# Patient Record
Sex: FEMALE | Race: WHITE | NOT HISPANIC OR LATINO | Employment: FULL TIME | URBAN - METROPOLITAN AREA
[De-identification: names, ages, dates, MRNs, and addresses within clinical notes are randomized per-mention and may not be internally consistent; named-entity substitution may affect disease eponyms.]

---

## 2017-07-25 ENCOUNTER — ALLSCRIPTS OFFICE VISIT (OUTPATIENT)
Dept: OTHER | Facility: OTHER | Age: 57
End: 2017-07-25

## 2017-07-25 DIAGNOSIS — Z12.31 ENCOUNTER FOR SCREENING MAMMOGRAM FOR MALIGNANT NEOPLASM OF BREAST: ICD-10-CM

## 2017-07-26 ENCOUNTER — GENERIC CONVERSION - ENCOUNTER (OUTPATIENT)
Dept: OTHER | Facility: OTHER | Age: 57
End: 2017-07-26

## 2017-07-31 ENCOUNTER — GENERIC CONVERSION - ENCOUNTER (OUTPATIENT)
Dept: OTHER | Facility: OTHER | Age: 57
End: 2017-07-31

## 2017-07-31 LAB
ADEQUACY: (HISTORICAL): NORMAL
CLINICIAN PROVIDIED ICD 9 OR 10 (HISTORICAL): NORMAL
COMMENT (HISTORICAL): NORMAL
DIAGNOSIS (HISTORICAL): NORMAL
NOTE: (HISTORICAL): NORMAL
PERFORMED BY (HISTORICAL): NORMAL
QC REVIEWED BY (HISTORICAL): NORMAL

## 2017-08-04 ENCOUNTER — GENERIC CONVERSION - ENCOUNTER (OUTPATIENT)
Dept: OTHER | Facility: OTHER | Age: 57
End: 2017-08-04

## 2017-08-17 ENCOUNTER — GENERIC CONVERSION - ENCOUNTER (OUTPATIENT)
Dept: OTHER | Facility: OTHER | Age: 57
End: 2017-08-17

## 2018-01-13 VITALS
BODY MASS INDEX: 32.47 KG/M2 | SYSTOLIC BLOOD PRESSURE: 120 MMHG | DIASTOLIC BLOOD PRESSURE: 76 MMHG | WEIGHT: 172 LBS | HEIGHT: 61 IN

## 2018-01-17 NOTE — MISCELLANEOUS
Message  Spoke with patient regarding R breast U/S  Nodularities seen on mammogram correspond with benign lymph nodes on U/S  Can return to normal screening mammogram schedule  Signatures   Electronically signed by : Ofelia Vieyra [facility];  Aug 17 2017  5:46PM EST                       (Author)

## 2018-07-25 ENCOUNTER — ANNUAL EXAM (OUTPATIENT)
Dept: OBGYN CLINIC | Facility: CLINIC | Age: 58
End: 2018-07-25
Payer: COMMERCIAL

## 2018-07-25 VITALS — BODY MASS INDEX: 33.44 KG/M2 | DIASTOLIC BLOOD PRESSURE: 76 MMHG | SYSTOLIC BLOOD PRESSURE: 128 MMHG | WEIGHT: 177 LBS

## 2018-07-25 DIAGNOSIS — Z12.39 BREAST CANCER SCREENING: ICD-10-CM

## 2018-07-25 DIAGNOSIS — Z01.419 PAP SMEAR, AS PART OF ROUTINE GYNECOLOGICAL EXAMINATION: ICD-10-CM

## 2018-07-25 DIAGNOSIS — Z01.419 ENCOUNTER FOR GYNECOLOGICAL EXAMINATION WITHOUT ABNORMAL FINDING: Primary | ICD-10-CM

## 2018-07-25 PROCEDURE — G0145 SCR C/V CYTO,THINLAYER,RESCR: HCPCS | Performed by: PHYSICIAN ASSISTANT

## 2018-07-25 PROCEDURE — S0612 ANNUAL GYNECOLOGICAL EXAMINA: HCPCS | Performed by: PHYSICIAN ASSISTANT

## 2018-07-25 RX ORDER — LANOLIN ALCOHOL/MO/W.PET/CERES
CREAM (GRAM) TOPICAL
COMMUNITY
End: 2019-03-05 | Stop reason: CLARIF

## 2018-07-25 RX ORDER — QUINAPRIL 10 MG/1
10 TABLET ORAL DAILY
COMMUNITY
Start: 2018-04-30

## 2018-07-25 RX ORDER — ALPRAZOLAM 0.25 MG/1
0.25 TABLET ORAL DAILY
COMMUNITY
Start: 2018-06-02

## 2018-07-25 RX ORDER — HYDROCHLOROTHIAZIDE 12.5 MG/1
12.5 CAPSULE, GELATIN COATED ORAL
COMMUNITY
Start: 2018-07-11

## 2018-07-25 RX ORDER — GLUCOSAMINE/D3/BOSWELLIA SERRA 1500MG-400
TABLET ORAL
COMMUNITY

## 2018-07-25 RX ORDER — LOVASTATIN 20 MG/1
20 TABLET ORAL
COMMUNITY
Start: 2018-05-27

## 2018-07-25 NOTE — PROGRESS NOTES
Assessment/Plan:    No problem-specific Assessment & Plan notes found for this encounter  Diagnoses and all orders for this visit:    Encounter for gynecological examination without abnormal finding    Pap smear, as part of routine gynecological examination  -     Liquid-based pap, screening    Breast cancer screening  -     Mammo screening bilateral w cad; Future    Other orders  -     ALPRAZolam (XANAX) 0 25 mg tablet;   -     Biotin 10 MG TABS; Take by mouth  -     calcium citrate-vitamin D (CITRACAL+D) 315-200 MG-UNIT per tablet; Take by mouth  -     Multiple Vitamins-Minerals (CENTRUM SILVER 50+MEN PO); Centrum Silver  -     hydrochlorothiazide (MICROZIDE) 12 5 mg capsule;   -     lovastatin (MEVACOR) 20 mg tablet;   -     metFORMIN (GLUCOPHAGE) 500 mg tablet;   -     quinapril (ACCUPRIL) 10 mg tablet;         Pap done  Patient given slip for mammogram   We will call [facility] to get R breast U/S report from last year  Stressed the importance of regular colonoscopy screening  Discussed vaginal atrophy with patient  Explained that vaginal tissue becomes thin and easily damaged and irritated with lack of estrogen  She can try coconut oil or silicone based lubricant for intercourse  Call if symptoms worsen  If no problems, patient to return in 1 year for routine Gyn care  Subjective:      Patient ID: Rizwana Tariq is a 63 y o  female  Patient is here for yearly Gyn exam   States she is doing well overall  Still gets frequent hot flashes  Sometimes has vaginal dryness and pain with intercourse  She denies any other menopausal symptoms  Patient also denies any change in bowel/bladder habits, pelvic pain, abdominal pain, n/v, change in appetite, and thyroid disease  Has bloating related to kidney issues  She is due for a colonoscopy  Mammogram last year showed new finding in R breast   She went to [facility] for U/S f/u, but we did not get the report    Patient states f/u imaging was benign  She has appointment in August for mammogram   She is performing self-breast exam   Denies new masses, skin changes, nipple discharge, and pain/tenderness  The following portions of the patient's history were reviewed and updated as appropriate: allergies, current medications, past family history, past medical history, past social history, past surgical history and problem list     Review of Systems   Constitutional: Negative for appetite change and unexpected weight change  Hot flashes   Cardiovascular:        No masses, skin changes, nipple discharge, and pain/tenderness  Gastrointestinal: Negative for abdominal distention, abdominal pain, constipation, diarrhea, nausea and vomiting  Genitourinary: Positive for dyspareunia (Due to dryness) and vaginal pain (Due to dryness)  Negative for difficulty urinating, dysuria, frequency, genital sores, hematuria, menstrual problem, pelvic pain, urgency, vaginal bleeding and vaginal discharge  Objective:      /76 (BP Location: Right arm, Patient Position: Sitting, Cuff Size: Standard)   Wt 80 3 kg (177 lb)   BMI 33 44 kg/m²          Physical Exam   Constitutional: She is oriented to person, place, and time  Vital signs are normal  She appears well-developed and well-nourished  Neck: No thyromegaly present  Cardiovascular: Normal rate, regular rhythm and normal heart sounds  Exam reveals no gallop and no friction rub  No murmur heard  Pulmonary/Chest: Effort normal and breath sounds normal  Right breast exhibits no inverted nipple, no mass, no nipple discharge, no skin change and no tenderness  Left breast exhibits no inverted nipple, no mass, no nipple discharge, no skin change and no tenderness  Breasts are symmetrical    Abdominal: Soft  Normal appearance and bowel sounds are normal  She exhibits no distension  There is no tenderness     Genitourinary: Rectum normal and uterus normal  Rectal exam shows anal tone normal and guaiac negative stool  No breast swelling, tenderness, discharge or bleeding  No labial fusion  There is no rash, tenderness, lesion or injury on the right labia  There is no rash, tenderness, lesion or injury on the left labia  Cervix exhibits no motion tenderness, no discharge and no friability  Right adnexum displays no mass, no tenderness and no fullness  Left adnexum displays no mass, no tenderness and no fullness  There is erythema and tenderness in the vagina  No bleeding in the vagina  No vaginal discharge found  Genitourinary Comments: Vaginal atrophy present   Lymphadenopathy:     She has no cervical adenopathy  Right: No inguinal adenopathy present  Left: No inguinal adenopathy present  Neurological: She is alert and oriented to person, place, and time  Skin: Skin is warm and dry  Psychiatric: She has a normal mood and affect  Her behavior is normal  Judgment and thought content normal    Vitals reviewed

## 2018-07-25 NOTE — PATIENT INSTRUCTIONS
Go for mammogram   Try coconut oil or silicone based lubricant for dryness  Call if symptoms worsen

## 2018-07-27 LAB
LAB AP GYN PRIMARY INTERPRETATION: NORMAL
Lab: NORMAL

## 2019-03-05 ENCOUNTER — CONSULT (OUTPATIENT)
Dept: HEMATOLOGY ONCOLOGY | Facility: CLINIC | Age: 59
End: 2019-03-05
Payer: COMMERCIAL

## 2019-03-05 VITALS
OXYGEN SATURATION: 98 % | SYSTOLIC BLOOD PRESSURE: 122 MMHG | DIASTOLIC BLOOD PRESSURE: 64 MMHG | TEMPERATURE: 98.8 F | HEART RATE: 113 BPM | HEIGHT: 62 IN | WEIGHT: 170.6 LBS | BODY MASS INDEX: 31.39 KG/M2 | RESPIRATION RATE: 17 BRPM

## 2019-03-05 DIAGNOSIS — D75.1 ERYTHROCYTOSIS: Primary | ICD-10-CM

## 2019-03-05 DIAGNOSIS — D75.839 THROMBOCYTOSIS: ICD-10-CM

## 2019-03-05 PROCEDURE — 99245 OFF/OP CONSLTJ NEW/EST HI 55: CPT | Performed by: INTERNAL MEDICINE

## 2019-03-05 NOTE — PROGRESS NOTES
[facility]  1960  [address] [facility]  [place] [address] [address] [phone]    Chief Complaint   Patient presents with    Consult            No history exists  History of Present Illness:  January 25, 2019 patient had routine CBC  WBC 6 6, hemoglobin 14 8, hematocrit 43 8, platelet count 120, normal differential   Previous blood work going back to October 2015 showed similar values in both hemoglobin/hematocrit and platelet counts  Review of Systems   Constitutional: Negative for appetite change, diaphoresis, fatigue and fever  HENT: Negative for sinus pain  Eyes: Negative for discharge  Respiratory: Negative for cough and shortness of breath  Cardiovascular: Negative for chest pain  Gastrointestinal: Negative for abdominal pain, constipation and diarrhea  Endocrine: Negative for cold intolerance  Genitourinary: Negative for difficulty urinating and hematuria  Musculoskeletal: Negative for joint swelling  Skin: Negative for rash  Allergic/Immunologic: Negative for environmental allergies  Neurological: Negative for dizziness and headaches  Hematological: Negative for adenopathy  Psychiatric/Behavioral: Negative for agitation  There is no problem list on file for this patient  History reviewed  No pertinent past medical history    Past Surgical History:   Procedure Laterality Date    GALLBLADDER SURGERY  2013    removal    SHOULDER SURGERY  2015    roatator cuff     Family History   Problem Relation Age of Onset    Diabetes Mother     Heart disease Mother      Social History     Socioeconomic History    Marital status: /Civil Union     Spouse name: Not on file    Number of children: Not on file    Years of education: Not on file    Highest education level: Not on file   Occupational History    Not on file   Social Needs    Financial resource strain: Not on file   Maik Medrano Food insecurity:     Worry: Not on file     Inability: Not on file    Transportation needs:     Medical: Not on file     Non-medical: Not on file   Tobacco Use    Smoking status: Never Smoker    Smokeless tobacco: Never Used   Substance and Sexual Activity    Alcohol use: Yes     Comment: socially    Drug use: No    Sexual activity: Yes   Lifestyle    Physical activity:     Days per week: Not on file     Minutes per session: Not on file    Stress: Not on file   Relationships    Social connections:     Talks on phone: Not on file     Gets together: Not on file     Attends Shinto service: Not on file     Active member of club or organization: Not on file     Attends meetings of clubs or organizations: Not on file     Relationship status: Not on file    Intimate partner violence:     Fear of current or ex partner: Not on file     Emotionally abused: Not on file     Physically abused: Not on file     Forced sexual activity: Not on file   Other Topics Concern    Not on file   Social History Narrative    Not on file       Current Outpatient Medications:     ALPRAZolam (XANAX) 0 25 mg tablet, 0 25 mg daily , Disp: , Rfl:     Biotin 10 MG TABS, Take by mouth daily , Disp: , Rfl:     CALCIUM-VITAMIN D PO, Take 5,000 Units by mouth daily, Disp: , Rfl:     hydrochlorothiazide (MICROZIDE) 12 5 mg capsule, 12 5 mg 4 times a week, Disp: , Rfl:     lovastatin (MEVACOR) 20 mg tablet, 20 mg daily at bedtime , Disp: , Rfl:     metFORMIN (GLUCOPHAGE) 500 mg tablet, 500 mg 2 (two) times a day with meals , Disp: , Rfl:     Multiple Vitamins-Minerals (CENTRUM SILVER 50+MEN PO), Centrum Silver, Disp: , Rfl:     quinapril (ACCUPRIL) 10 mg tablet, 10 mg daily , Disp: , Rfl:   Allergies   Allergen Reactions    Penicillins      Vitals:    03/05/19 1436   BP: 122/64   Pulse: (!) 113   Resp: 17   Temp: 98 8 °F (37 1 °C)   SpO2: 98%       Physical Exam   Constitutional: She is oriented to person, place, and time   She appears well-developed  HENT:   Head: Normocephalic  Eyes: Pupils are equal, round, and reactive to light  Neck: Neck supple  Cardiovascular: Normal rate  No murmur heard  Pulmonary/Chest: No respiratory distress  She has no wheezes  She has no rales  Abdominal: Soft  She exhibits no distension  There is no tenderness  There is no rebound  Musculoskeletal: She exhibits no edema  Lymphadenopathy:     She has no cervical adenopathy  Neurological: She is alert and oriented to person, place, and time  She displays normal reflexes  Skin: Skin is warm  No rash noted  Psychiatric: She has a normal mood and affect  Thought content normal          Labs:  CBC, Coags, BMP, Mg, Phos      Imaging  No results found  I reviewed the above laboratory and imaging data  Discussion/Summary:  In summary, this is a 55-year-old female history of mild erythrocytosis and thrombocytosis  The significance of these findings is not entirely clear  This certainly could represent polycythemia  Normalcy of her MCV suggests she is iron replete  None the less, iron deficiency certainly could cause reactive thrombocytosis  If she had polycythemia and iron deficiency together the above labs could be the result  Differential diagnosis would include 1 process explaining both of these findings, such as polycythemia vera  Alternatively, 2 separate processes could explain this such as erythropoietin over production, hypoxic state, statistical artifact, decreased plasma volume secondary to diuretic, etc PLUS a separate process causing thrombocytosis such as reactive/inflammatory, etc   I ordered blood work to investigate a variety of these possible scenarios and will see her back thereafter to review the results  She is currently taking aspirin 81 mg p o  Daily  I asked her to continue this for the moment  The patient voiced understanding and agreement

## 2019-03-07 ENCOUNTER — TELEPHONE (OUTPATIENT)
Dept: HEMATOLOGY ONCOLOGY | Facility: CLINIC | Age: 59
End: 2019-03-07

## 2019-03-07 NOTE — TELEPHONE ENCOUNTER
Elijah Cespedes called to get the last office visit from Dr Natalia Rousseau for insurance purposes  She needs to submit an authorization to the insurance regarding some blood work that Dr Micaela Sosa had ordered  I faxed over the office note to the following Fax number given to me: [phone]

## 2019-03-22 ENCOUNTER — OFFICE VISIT (OUTPATIENT)
Dept: HEMATOLOGY ONCOLOGY | Facility: CLINIC | Age: 59
End: 2019-03-22
Payer: COMMERCIAL

## 2019-03-22 VITALS
HEART RATE: 104 BPM | WEIGHT: 170 LBS | HEIGHT: 63 IN | DIASTOLIC BLOOD PRESSURE: 80 MMHG | SYSTOLIC BLOOD PRESSURE: 130 MMHG | BODY MASS INDEX: 30.12 KG/M2 | TEMPERATURE: 98.1 F | RESPIRATION RATE: 16 BRPM

## 2019-03-22 DIAGNOSIS — D75.1 ERYTHROCYTOSIS: Primary | ICD-10-CM

## 2019-03-22 PROCEDURE — 99215 OFFICE O/P EST HI 40 MIN: CPT | Performed by: PHYSICIAN ASSISTANT

## 2019-03-22 NOTE — LETTER
March 22, 2019     Devi Richey MD  [address] [place] [address]    Patient: Conchis Clifton   YOB: 1960   Date of Visit: 3/22/2019       Dear Dr Chemo Moura: Thank you for referring Conchis Clifton to me for evaluation  Below are my notes for this consultation  If you have questions, please do not hesitate to call me  I look forward to following your patient along with you  Sincerely,        Angeline Moore PA-C        CC: No Recipients  Angeline Moore PA-C  3/22/2019  2:40 PM  Sign at close encounter  [address] [place] [address]  Hematology Ambulatory Follow-Up  Conchis Clifton, 1960, [id number]  3/22/2019    Assessment/Plan:  1  Erythrocytosis  Likely relative erythrocytosis secondary to diuretic verses uncontrolled sleep apnea  Initial workup included and MPN gene testing which returned negative, erythropoietin which was low normal, normal iron studies and normal inflammatory markers  I briefly discussed with the patient other etiologies that could cause erythrocytosis such as some occult masses ie  Thyoma, Renal Malignancy, obstructive sleep apnea as well as relative erythrocytosis secondary to diuretic use  Patient had a urinalysis completed in January that was negative for occult blood  Patient also has a history of obstructive sleep apnea after undergoing testing approximately 3 years ago  Patient was advised to follow up with a home BiPAP machine however she did not pursue this  Patient is also on a diuretic, low dose, 4 days a week  However the patient also recalls that approximately in 2015 the patient had increased her dose to 4 days a week compared to 3 days a week prior  This correlates to the approximate increase in the patient's hemoglobin      At this time, I have advised the patient to discontinue her diuretic, hydrochlorothiazide 12 5 mg 4 days a week for a period of 1 week  During this time I have advised the patient to increase her fluid consumption  Patient is to monitor her weight  She is to call the office if she exceeds a weight gain of 5 lb well off of her diuretic  After a week of being off of the medication, patient was instructed on day 8 to hydrate very well at least 32 oz of water and to go and have blood work completed  Patient was instructed to call our office the following Tuesday in order to review over the phone the results  If patient's erythrocytosis corrects, then it is likely strictly due to plasma volume/dehydration  However for erythrocytosis continues to increase, referral will be placed to sleep medicine for additional sleep apnea study  If urinalysis that will also be completed on the same day as CBC, returns with blood in the urine, then additional orders will be placed for CT scan of the abdomen as well as a CT of the chest to rule out occult malignancy  I discussed the above with the patient today  She understands to call the office as directed or if she has any additional questions or concerns  Patient's  was also with her today, all of his questions were answered to his/their understanding     - CBC and differential; Future  - UA (URINE) with reflex to Microscopic; Future    The patient is scheduled for follow-up in approximately 4 weeks  Patient voiced agreement and understanding to the above  Patient knows to call the Hematology/Oncology office with any questions and concerns regarding the above  Carefully review your medication list in verify the list is accurate and up-to-date  Please call the hematologic/oncology office if there medications missing from the less, medications on the list that your not currently taking or if there is a dosage or instruction that is different from higher taking medication      I have spent 40 minutes with Patient and family today in which greater than 50% of this time was spent in counseling/coordination of care regarding Diagnostic results, Intructions for management, Patient and family education and Impressions  Barrier(s) to care: None  The patient is  able to self care   ------------------------------------------------------------------------------------------------------    Chief Complaint   Patient presents with    Follow-up       History of present illness: This is a 52-year-old with past medical history of  hypertension, persisting protein urea/mild underlying kidney disease, anxiety with recent erythrocytosis that developed between 2010 through 2015, who after routine blood work in January 2019 was referred to Hematology after persisting elevated hemoglobin and hematocrit  March 2007:  WBC = 4 9, hemoglobin = 12 6, hematocrit = 37 5, platelet count = 484, MCV = 85, RDW = 14 3, differential = W and L     September 2010: White blood cell count = 7 8, hematocrit = 33 4, hemoglobin = 10 7, platelet count = 794, MCV = 74, RDW = 15 6, differential = WNL, positive microcytosis in hypochromasia  Patient notes that around September 2010 she was having significant issues with increased vaginal bleeding secondary to perimenopausal irregular menstrual bleeding  Patient was started on a an oral iron supplement  Patient eventually had a uterine ablation  Patient became menopausal 2 years after  Interestingly during this time, patient's platelet count was elevated and her iron indices demonstrate significant iron deficiency anemia  This explains the patient's history of mild increase in platelet count  October 2015:  WBC = 6 6, hemoglobin = 15 0, hematocrit = 43 8, platelet count = 981    Patient believes that she had a home sleep apnea study completed sometime in early 2016 approximately 3 years from Hematology evaluation    Sleep study did demonstrate that she have obstructive sleep apnea, however the patient did not want asleep with the CPAP or BiPAP and she disregarded instructions to follow up for machine  April 2016:  WBC = 6 6, hemoglobin = 14 6, hematocrit = 43 1, platelet count = 942    12/2018:  WBC = 7 9, hemoglobin = 15 1, hematocrit = 45 0, platelet count = 414, RBC indices = WNL, urinalysis demonstrates mild protein, elevated leukocytes and few bacteria without nitrates, blood, ketones or glucose  January 25, 2019 :  WBC 6 6, hemoglobin 14 8, hematocrit 43 8, platelet count 856, normal differential   Previous blood work going back to October 2015 showed similar values in both hemoglobin/hematocrit and platelet counts  March 2019:  Patient underwent evaluation with Hematology  ALEX 2, MPL and CALR mutation testing was negative, NANNETTE, RF were negative, inflammation markers including sed rate and CRP were both within normal limits  Erythropoietin level was low normal at 9  Interval history:  No significant interval history  Patient feels very well  Weight is stable  No issues with increased urination, polydipsia  Patient does note continued hot flashes, feels sometimes like constant sweating  Review of Systems   Constitutional: Negative for appetite change, fatigue, fever and unexpected weight change  HENT: Negative for nosebleeds  Respiratory: Negative for cough, choking and shortness of breath  Negative hemoptysis  Cardiovascular: Negative for chest pain, palpitations and leg swelling  Gastrointestinal: Negative  Negative for abdominal distention, abdominal pain, anal bleeding, blood in stool, constipation, diarrhea, nausea and vomiting  Endocrine: Negative  Negative for cold intolerance  Genitourinary: Negative  Negative for hematuria, menstrual problem, vaginal bleeding, vaginal discharge and vaginal pain  Musculoskeletal: Negative  Negative for arthralgias, myalgias, neck pain and neck stiffness  Skin: Negative  Negative for color change, pallor and rash  Allergic/Immunologic: Negative    Negative for immunocompromised state  Neurological: Negative  Negative for weakness and headaches  Hematological: Negative for adenopathy  Does not bruise/bleed easily  All other systems reviewed and are negative  Patient Active Problem List   Diagnosis    Erythrocytosis    Thrombocytosis ([facility] [address] )       No past medical history on file      Past Surgical History:   Procedure Laterality Date    GALLBLADDER SURGERY  2013    removal    SHOULDER SURGERY  2015    roatator cuff       Family History   Problem Relation Age of Onset    Diabetes Mother     Heart disease Mother        Social History     Socioeconomic History    Marital status: /Civil Union     Spouse name: Not on file    Number of children: Not on file    Years of education: Not on file    Highest education level: Not on file   Occupational History    Not on file   Social Needs    Financial resource strain: Not on file    Food insecurity:     Worry: Not on file     Inability: Not on file    Transportation needs:     Medical: Not on file     Non-medical: Not on file   Tobacco Use    Smoking status: Never Smoker    Smokeless tobacco: Never Used   Substance and Sexual Activity    Alcohol use: Yes     Comment: socially    Drug use: No    Sexual activity: Yes   Lifestyle    Physical activity:     Days per week: Not on file     Minutes per session: Not on file    Stress: Not on file   Relationships    Social connections:     Talks on phone: Not on file     Gets together: Not on file     Attends [employer] service: Not on file     Active member of club or organization: Not on file     Attends meetings of clubs or organizations: Not on file     Relationship status: Not on file    Intimate partner violence:     Fear of current or ex partner: Not on file     Emotionally abused: Not on file     Physically abused: Not on file     Forced sexual activity: Not on file   Other Topics Concern    Not on file   Social History Narrative    Not on file Current Outpatient Medications:     ALPRAZolam (XANAX) 0 25 mg tablet, 0 25 mg daily , Disp: , Rfl:     Biotin 10 MG TABS, Take by mouth daily , Disp: , Rfl:     hydrochlorothiazide (MICROZIDE) 12 5 mg capsule, 12 5 mg 4 times a week, Disp: , Rfl:     lovastatin (MEVACOR) 20 mg tablet, 20 mg daily at bedtime , Disp: , Rfl:     metFORMIN (GLUCOPHAGE) 500 mg tablet, 500 mg 2 (two) times a day with meals , Disp: , Rfl:     Multiple Vitamins-Minerals (CENTRUM SILVER 50+MEN PO), Centrum Silver, Disp: , Rfl:     quinapril (ACCUPRIL) 10 mg tablet, 10 mg daily , Disp: , Rfl:     CALCIUM-VITAMIN D PO, Take 5,000 Units by mouth daily, Disp: , Rfl:     Allergies   Allergen Reactions    Penicillins        Objective:  /80   Pulse 104   Temp 98 1 °F (36 7 °C) (Tympanic)   Resp 16   Ht 5' 2 5" (1 588 m)   Wt 77 1 kg (170 lb)   BMI 30 60 kg/m²     Physical Exam   Constitutional: She is oriented to person, place, and time  She appears well-developed and well-nourished  No distress  HENT:   Head: Normocephalic and atraumatic  Eyes: Pupils are equal, round, and reactive to light  No scleral icterus  Cardiovascular: Normal rate and regular rhythm  No murmur heard  Pulmonary/Chest: Effort normal  No respiratory distress  Musculoskeletal: She exhibits no edema  Neurological: She is alert and oriented to person, place, and time  Skin: Skin is warm  No rash noted  No pallor  Psychiatric: She has a normal mood and affect  Thought content normal        Result Review: All through Care everywhere  Please note: This report has been generated by a voice recognition software system  Therefore there may be syntax, spelling, and/or grammatical errors  Please call if you have any questions

## 2019-03-22 NOTE — PROGRESS NOTES
[address] [facility] [address]  Hematology Ambulatory Follow-Up  Frankey Chihuahua, 1960, [id number]  3/22/2019    Assessment/Plan:  1  Erythrocytosis  Likely relative erythrocytosis secondary to diuretic verses uncontrolled sleep apnea  Initial workup included and MPN gene testing which returned negative, erythropoietin which was low normal, normal iron studies and normal inflammatory markers  I briefly discussed with the patient other etiologies that could cause erythrocytosis such as some occult masses ie  Thyoma, Renal Malignancy, obstructive sleep apnea as well as relative erythrocytosis secondary to diuretic use  Patient had a urinalysis completed in January that was negative for occult blood  Patient also has a history of obstructive sleep apnea after undergoing testing approximately 3 years ago  Patient was advised to follow up with a home BiPAP machine however she did not pursue this  Patient is also on a diuretic, low dose, 4 days a week  However the patient also recalls that approximately in 2015 the patient had increased her dose to 4 days a week compared to 3 days a week prior  This correlates to the approximate increase in the patient's hemoglobin  At this time, I have advised the patient to discontinue her diuretic, hydrochlorothiazide 12 5 mg 4 days a week for a period of 1 week  During this time I have advised the patient to increase her fluid consumption  Patient is to monitor her weight  She is to call the office if she exceeds a weight gain of 5 lb well off of her diuretic  After a week of being off of the medication, patient was instructed on day 8 to hydrate very well at least 32 oz of water and to go and have blood work completed  Patient was instructed to call our office the following Tuesday in order to review over the phone the results    If patient's erythrocytosis corrects, then it is likely strictly due to plasma volume/dehydration  However for erythrocytosis continues to increase, referral will be placed to sleep medicine for additional sleep apnea study  If urinalysis that will also be completed on the same day as CBC, returns with blood in the urine, then additional orders will be placed for CT scan of the abdomen as well as a CT of the chest to rule out occult malignancy  I discussed the above with the patient today  She understands to call the office as directed or if she has any additional questions or concerns  Patient's  was also with her today, all of his questions were answered to his/their understanding     - CBC and differential; Future  - UA (URINE) with reflex to Microscopic; Future    The patient is scheduled for follow-up in approximately 4 weeks  Patient voiced agreement and understanding to the above  Patient knows to call the Hematology/Oncology office with any questions and concerns regarding the above  Carefully review your medication list in verify the list is accurate and up-to-date  Please call the hematologic/oncology office if there medications missing from the less, medications on the list that your not currently taking or if there is a dosage or instruction that is different from higher taking medication  I have spent 40 minutes with Patient and family today in which greater than 50% of this time was spent in counseling/coordination of care regarding Diagnostic results, Intructions for management, Patient and family education and Impressions  Barrier(s) to care: None  The patient is  able to self care   ------------------------------------------------------------------------------------------------------    Chief Complaint   Patient presents with    Follow-up       History of present illness:   This is a 60-year-old with past medical history of  hypertension, persisting protein urea/mild underlying kidney disease, anxiety with recent erythrocytosis that developed between 2010 through 2015, who after routine blood work in January 2019 was referred to Hematology after persisting elevated hemoglobin and hematocrit  March 2007:  WBC = 4 9, hemoglobin = 12 6, hematocrit = 37 5, platelet count = 542, MCV = 85, RDW = 14 3, differential = W and L     September 2010: White blood cell count = 7 8, hematocrit = 33 4, hemoglobin = 10 7, platelet count = 177, MCV = 74, RDW = 15 6, differential = WNL, positive microcytosis in hypochromasia  Patient notes that around September 2010 she was having significant issues with increased vaginal bleeding secondary to perimenopausal irregular menstrual bleeding  Patient was started on a an oral iron supplement  Patient eventually had a uterine ablation  Patient became menopausal 2 years after  Interestingly during this time, patient's platelet count was elevated and her iron indices demonstrate significant iron deficiency anemia  This explains the patient's history of mild increase in platelet count  October 2015:  WBC = 6 6, hemoglobin = 15 0, hematocrit = 43 8, platelet count = 598    Patient believes that she had a home sleep apnea study completed sometime in early 2016 approximately 3 years from Hematology evaluation  Sleep study did demonstrate that she have obstructive sleep apnea, however the patient did not want asleep with the CPAP or BiPAP and she disregarded instructions to follow up for machine  April 2016:  WBC = 6 6, hemoglobin = 14 6, hematocrit = 43 1, platelet count = 363    12/2018:  WBC = 7 9, hemoglobin = 15 1, hematocrit = 45 0, platelet count = 107, RBC indices = WNL, urinalysis demonstrates mild protein, elevated leukocytes and few bacteria without nitrates, blood, ketones or glucose      January 25, 2019 :  WBC 6 6, hemoglobin 14 8, hematocrit 43 8, platelet count 861, normal differential   Previous blood work going back to October 2015 showed similar values in both hemoglobin/hematocrit and platelet counts  March 2019:  Patient underwent evaluation with Hematology  ALEX 2, MPL and CALR mutation testing was negative, NANNETTE, RF were negative, inflammation markers including sed rate and CRP were both within normal limits  Erythropoietin level was low normal at 9  Interval history:  No significant interval history  Patient feels very well  Weight is stable  No issues with increased urination, polydipsia  Patient does note continued hot flashes, feels sometimes like constant sweating  Review of Systems   Constitutional: Negative for appetite change, fatigue, fever and unexpected weight change  HENT: Negative for nosebleeds  Respiratory: Negative for cough, choking and shortness of breath  Negative hemoptysis  Cardiovascular: Negative for chest pain, palpitations and leg swelling  Gastrointestinal: Negative  Negative for abdominal distention, abdominal pain, anal bleeding, blood in stool, constipation, diarrhea, nausea and vomiting  Endocrine: Negative  Negative for cold intolerance  Genitourinary: Negative  Negative for hematuria, menstrual problem, vaginal bleeding, vaginal discharge and vaginal pain  Musculoskeletal: Negative  Negative for arthralgias, myalgias, neck pain and neck stiffness  Skin: Negative  Negative for color change, pallor and rash  Allergic/Immunologic: Negative  Negative for immunocompromised state  Neurological: Negative  Negative for weakness and headaches  Hematological: Negative for adenopathy  Does not bruise/bleed easily  All other systems reviewed and are negative  Patient Active Problem List   Diagnosis    Erythrocytosis    Thrombocytosis ([address] )       No past medical history on file      Past Surgical History:   Procedure Laterality Date    GALLBLADDER SURGERY  2013    removal    SHOULDER SURGERY  2015    roatator cuff       Family History   Problem Relation Age of Onset    Diabetes Mother  Heart disease Mother        Social History     Socioeconomic History    Marital status: /Civil Union     Spouse name: Not on file    Number of children: Not on file    Years of education: Not on file    Highest education level: Not on file   Occupational History    Not on file   Social Needs    Financial resource strain: Not on file    Food insecurity:     Worry: Not on file     Inability: Not on file    Transportation needs:     Medical: Not on file     Non-medical: Not on file   Tobacco Use    Smoking status: Never Smoker    Smokeless tobacco: Never Used   Substance and Sexual Activity    Alcohol use: Yes     Comment: socially    Drug use: No    Sexual activity: Yes   Lifestyle    Physical activity:     Days per week: Not on file     Minutes per session: Not on file    Stress: Not on file   Relationships    Social connections:     Talks on phone: Not on file     Gets together: Not on file     Attends Zoroastrianism service: Not on file     Active member of club or organization: Not on file     Attends meetings of clubs or organizations: Not on file     Relationship status: Not on file    Intimate partner violence:     Fear of current or ex partner: Not on file     Emotionally abused: Not on file     Physically abused: Not on file     Forced sexual activity: Not on file   Other Topics Concern    Not on file   Social History Narrative    Not on file         Current Outpatient Medications:     ALPRAZolam (XANAX) 0 25 mg tablet, 0 25 mg daily , Disp: , Rfl:     Biotin 10 MG TABS, Take by mouth daily , Disp: , Rfl:     hydrochlorothiazide (MICROZIDE) 12 5 mg capsule, 12 5 mg 4 times a week, Disp: , Rfl:     lovastatin (MEVACOR) 20 mg tablet, 20 mg daily at bedtime , Disp: , Rfl:     metFORMIN (GLUCOPHAGE) 500 mg tablet, 500 mg 2 (two) times a day with meals , Disp: , Rfl:     Multiple Vitamins-Minerals (CENTRUM SILVER 50+MEN PO), Centrum Silver, Disp: , Rfl:     quinapril (ACCUPRIL) 10 mg tablet, 10 mg daily , Disp: , Rfl:     CALCIUM-VITAMIN D PO, Take 5,000 Units by mouth daily, Disp: , Rfl:     Allergies   Allergen Reactions    Penicillins        Objective:  /80   Pulse 104   Temp 98 1 °F (36 7 °C) (Tympanic)   Resp 16   Ht 5' 2 5" (1 588 m)   Wt 77 1 kg (170 lb)   BMI 30 60 kg/m²    Physical Exam   Constitutional: She is oriented to person, place, and time  She appears well-developed and well-nourished  No distress  HENT:   Head: Normocephalic and atraumatic  Eyes: Pupils are equal, round, and reactive to light  No scleral icterus  Cardiovascular: Normal rate and regular rhythm  No murmur heard  Pulmonary/Chest: Effort normal  No respiratory distress  Musculoskeletal: She exhibits no edema  Neurological: She is alert and oriented to person, place, and time  Skin: Skin is warm  No rash noted  No pallor  Psychiatric: She has a normal mood and affect  Thought content normal        Result Review: All through Care everywhere  Please note: This report has been generated by a voice recognition software system  Therefore there may be syntax, spelling, and/or grammatical errors  Please call if you have any questions

## 2019-04-05 ENCOUNTER — TELEPHONE (OUTPATIENT)
Dept: HEMATOLOGY ONCOLOGY | Facility: CLINIC | Age: 59
End: 2019-04-05

## 2019-04-09 ENCOUNTER — TELEPHONE (OUTPATIENT)
Dept: HEMATOLOGY ONCOLOGY | Facility: CLINIC | Age: 59
End: 2019-04-09

## 2020-03-04 ENCOUNTER — TELEPHONE (OUTPATIENT)
Dept: HEMATOLOGY ONCOLOGY | Facility: CLINIC | Age: 60
End: 2020-03-04

## 2020-03-04 NOTE — TELEPHONE ENCOUNTER
Patient called to schedule a follow up with Dr Catrachita Gilbert per her pcp  Patient was scheduled 4/9 3:20 pm at the [facility] office, she stated that she will have her blood work from her pcp faxed to the [facility] office

## 2020-03-05 NOTE — TELEPHONE ENCOUNTER
Patient called to reschedule her 4/9 with Dr Richardson to 4/30 3:30 pm at the [address] [place] [address]

## 2020-04-16 ENCOUNTER — TELEPHONE (OUTPATIENT)
Dept: HEMATOLOGY ONCOLOGY | Facility: MEDICAL CENTER | Age: 60
End: 2020-04-16

## 2020-04-16 DIAGNOSIS — D75.1 ERYTHROCYTOSIS: Primary | ICD-10-CM

## 2020-04-16 DIAGNOSIS — D75.839 THROMBOCYTOSIS: ICD-10-CM

## 2020-06-24 ENCOUNTER — TELEPHONE (OUTPATIENT)
Dept: HEMATOLOGY ONCOLOGY | Facility: CLINIC | Age: 60
End: 2020-06-24

## 2020-06-24 ENCOUNTER — TELEPHONE (OUTPATIENT)
Dept: OTHER | Facility: OTHER | Age: 60
End: 2020-06-24

## 2020-07-16 ENCOUNTER — ANNUAL EXAM (OUTPATIENT)
Dept: OBGYN CLINIC | Facility: CLINIC | Age: 60
End: 2020-07-16
Payer: COMMERCIAL

## 2020-07-16 VITALS
TEMPERATURE: 99 F | WEIGHT: 164 LBS | BODY MASS INDEX: 29.52 KG/M2 | DIASTOLIC BLOOD PRESSURE: 76 MMHG | SYSTOLIC BLOOD PRESSURE: 112 MMHG

## 2020-07-16 DIAGNOSIS — Z01.419 ENCNTR FOR GYN EXAM (GENERAL) (ROUTINE) W/O ABN FINDINGS: Primary | ICD-10-CM

## 2020-07-16 DIAGNOSIS — Z12.39 BREAST CANCER SCREENING: ICD-10-CM

## 2020-07-16 DIAGNOSIS — Z01.419 PAP SMEAR, AS PART OF ROUTINE GYNECOLOGICAL EXAMINATION: ICD-10-CM

## 2020-07-16 PROCEDURE — 99396 PREV VISIT EST AGE 40-64: CPT | Performed by: OBSTETRICS & GYNECOLOGY

## 2020-07-16 RX ORDER — LORATADINE AND PSEUDOEPHEDRINE 10; 240 MG/1; MG/1
1 TABLET, EXTENDED RELEASE ORAL DAILY
COMMUNITY

## 2020-07-16 RX ORDER — LOVASTATIN 40 MG/1
1 TABLET ORAL
COMMUNITY
Start: 2020-03-21

## 2020-07-16 RX ORDER — METFORMIN HYDROCHLORIDE 500 MG/1
TABLET, EXTENDED RELEASE ORAL
COMMUNITY
Start: 2020-05-18

## 2020-07-16 RX ORDER — FEXOFENADINE HYDROCHLORIDE 60 MG/1
60 TABLET, FILM COATED ORAL
COMMUNITY

## 2020-07-16 RX ORDER — DICLOFENAC POTASSIUM 50 MG/1
50 TABLET, FILM COATED ORAL 3 TIMES DAILY
COMMUNITY
Start: 2017-02-17

## 2020-07-16 RX ORDER — CYCLOBENZAPRINE HCL 10 MG
10 TABLET ORAL DAILY PRN
COMMUNITY
Start: 2017-02-17

## 2020-07-24 ENCOUNTER — OFFICE VISIT (OUTPATIENT)
Dept: HEMATOLOGY ONCOLOGY | Facility: CLINIC | Age: 60
End: 2020-07-24
Payer: COMMERCIAL

## 2020-07-24 VITALS
SYSTOLIC BLOOD PRESSURE: 132 MMHG | HEART RATE: 88 BPM | DIASTOLIC BLOOD PRESSURE: 74 MMHG | HEIGHT: 63 IN | RESPIRATION RATE: 18 BRPM | BODY MASS INDEX: 29.15 KG/M2 | TEMPERATURE: 98.1 F | WEIGHT: 164.5 LBS | OXYGEN SATURATION: 98 %

## 2020-07-24 DIAGNOSIS — D75.1 ERYTHROCYTOSIS: Primary | ICD-10-CM

## 2020-07-24 DIAGNOSIS — D75.839 THROMBOCYTOSIS: ICD-10-CM

## 2020-07-24 PROCEDURE — 99215 OFFICE O/P EST HI 40 MIN: CPT | Performed by: PHYSICIAN ASSISTANT

## 2020-07-24 NOTE — PROGRESS NOTES
[address] [facility] [address]  Hematology Ambulatory Follow-Up  Duyen Guzman, 1960, [id number]  7/24/2020    Assessment/Plan:    1  Erythrocytosis  Patient has very mild erythrocytosis likely relative in nature  Initial workup including MPN testing returned negative erythropoietin was low normal and the patient had normal iron studies and normal inflammatory markers  We briefly discussed that there are other etiologies for erythrocytosis however typically erythropoietin levels would be elevated  Patient's urinalysis was negative for occult blood in January  Patient was able to hydrate well and was off of the diuretic prior to her last blood test   Patient's hemoglobin level was 15 0 and hematocrit was 45%  These numbers are normal       I will recheck the patient again in approximately six months  Patient has never had any sequela of disease such as coagulopathies/thrombosis or emboli  Patient will continue to hydrate well prior to each blood draws  Patient is no longer on diuretic  I discussed the patient bone marrow biopsy could also be completed to follow-up on both receptor cytosis and thrombocytosis  However at this time with the numbers being stable and just over the upper limit of normal on necessary considering the patient feels very well without fatigue or other signs of underlying bone marrow malignancy  Patient knows she can call the office at any time   If she experiences red flag symptoms as we discussed today  - CBC and differential; Future    2  Thrombocytosis ([address] )    See 1  Above  Patient's platelet count today was less than 390,000 per unit L  Continued observation with repeat blood count in six months was advised  - CBC and differential; Future      The patient is scheduled for follow-up in approximately 6 months  Patient voiced agreement and understanding to the above  Patient knows to call the Hematology/Oncology office with any questions and concerns regarding the above  I have spent 30 minutes with Patient  today in which greater than 50% of this time was spent in counseling/coordination of care regarding Diagnostic results, Prognosis, Intructions for management and Impressions  Barrier(s) to care: None  The patient is able to self care   ------------------------------------------------------------------------------------------------------    Chief Complaint   Patient presents with    Follow-up       History of present illness: This is a 36-year-old with past medical history of  hypertension, persisting protein urea/mild underlying kidney disease, anxiety with recent erythrocytosis that developed between 2010 through 2015, who after routine blood work in January 2019 was referred to Hematology after persisting elevated hemoglobin and hematocrit        March 2007:  WBC = 4 9, hemoglobin = 12 6, hematocrit = 37 5, platelet count = 484, MCV = 85, RDW = 14 3, differential = W and L     September 2010: White blood cell count = 7 8, hematocrit = 33 4, hemoglobin = 10 7, platelet count = 869, MCV = 74, RDW = 15 6, differential = WNL, positive microcytosis in hypochromasia      Patient notes that around September 2010 she was having significant issues with increased vaginal bleeding secondary to perimenopausal irregular menstrual bleeding  Patient was started on a an oral iron supplement  Patient eventually had a uterine ablation  Patient became menopausal 2 years after  Interestingly during this time, patient's platelet count was elevated and her iron indices demonstrate significant iron deficiency anemia    This explains the patient's history of mild increase in platelet count      October 2015:  WBC = 6 6, hemoglobin = 15 0, hematocrit = 43 8, platelet count = 144     Patient believes that she had a home sleep apnea study completed sometime in early 2016 approximately 3 years from Hematology evaluation  Sleep study did demonstrate that she have obstructive sleep apnea, however the patient did not want asleep with the CPAP or BiPAP and she disregarded instructions to follow up for machine      April 2016:  WBC = 6 6, hemoglobin = 14 6, hematocrit = 43 1, platelet count = 611     12/2018:  WBC = 7 9, hemoglobin = 15 1, hematocrit = 45 0, platelet count = 185, RBC indices = WNL, urinalysis demonstrates mild protein, elevated leukocytes and few bacteria without nitrates, blood, ketones or glucose      January 25, 2019 :  WBC 6 6, hemoglobin 14 8, hematocrit 43 8, platelet count 729, normal differential   Previous blood work going back to October 2015 showed similar values in both hemoglobin/hematocrit and platelet counts      March 2019:  Patient underwent evaluation with Hematology  ALEX 2, MPL and CALR mutation testing was negative, NANNETTE, RF were negative, inflammation markers including sed rate and CRP were both within normal limits  Erythropoietin level was low normal at 9 December 2019:  WBC = 7 4 Hemoglobin = 15 4, hematocrit = 45 1, platelet count = 151     February 2020: WBC = 8 1, hemoglobin = 15 1, hematocrit = 44 1%, platelet count = 601     July 2020:  WBC = 8 6, hemoglobin = 15 0, hematocrit = 44 3, platelet count = 041, urinalysis negative for occult blood; Positive for protein leukocyte esterase and bacteria  Interval history:    No major interval changes  Patient notes that she feels well, stress levels have gone down since COVID-19 and she is at home  Patient enjoys   The extra time at home  Review of Systems   Constitutional: Negative for appetite change, fatigue, fever and unexpected weight change  HENT: Negative for nosebleeds  Respiratory: Negative for cough, choking and shortness of breath  Negative hemoptysis  Cardiovascular: Negative for chest pain, palpitations and leg swelling  Gastrointestinal: Negative    Negative for abdominal distention, abdominal pain, anal bleeding, blood in stool, constipation, diarrhea, nausea and vomiting  Endocrine: Negative  Negative for cold intolerance  Genitourinary: Negative  Negative for hematuria, menstrual problem, vaginal bleeding, vaginal discharge and vaginal pain  Musculoskeletal: Negative  Negative for arthralgias, myalgias, neck pain and neck stiffness  Skin: Negative  Negative for color change, pallor and rash  Allergic/Immunologic: Negative  Negative for immunocompromised state  Neurological: Negative  Negative for weakness and headaches  Hematological: Negative for adenopathy  Does not bruise/bleed easily  All other systems reviewed and are negative  Patient Active Problem List   Diagnosis    Erythrocytosis    Thrombocytosis ([facility] [address] )       No past medical history on file      Past Surgical History:   Procedure Laterality Date    GALLBLADDER SURGERY  2013    removal    SHOULDER SURGERY  2015    roatator cuff       Family History   Problem Relation Age of Onset    Diabetes Mother     Heart disease Mother        Social History     Socioeconomic History    Marital status: /Civil Union     Spouse name: None    Number of children: None    Years of education: None    Highest education level: None   Occupational History    None   Social Needs    Financial resource strain: None    Food insecurity:     Worry: None     Inability: None    Transportation needs:     Medical: None     Non-medical: None   Tobacco Use    Smoking status: Never Smoker    Smokeless tobacco: Never Used   Substance and Sexual Activity    Alcohol use: Yes     Comment: socially    Drug use: No    Sexual activity: Yes   Lifestyle    Physical activity:     Days per week: None     Minutes per session: None    Stress: None   Relationships    Social connections:     Talks on phone: None     Gets together: None     Attends Buddhist service: None     Active member of club or organization: None     Attends meetings of clubs or organizations: None     Relationship status: None    Intimate partner violence:     Fear of current or ex partner: None     Emotionally abused: None     Physically abused: None     Forced sexual activity: None   Other Topics Concern    None   Social History Narrative    None         Current Outpatient Medications:     ALPRAZolam (XANAX) 0 25 mg tablet, 0 25 mg daily , Disp: , Rfl:     Biotin 70394 MCG TABS, Take by mouth 4 (four) times a week , Disp: , Rfl:     Calcium Carbonate-Vitamin D (CALCIUM-D PO), Calcium + D, Disp: , Rfl:     CALCIUM-VITAMIN D PO, Take 1,200 Units by mouth 3 (three) times a week , Disp: , Rfl:     CINNAMON PO, Take 1,000 mg by mouth 4 (four) times a week, Disp: , Rfl:     loratadine-pseudoephedrine (CLARITIN-D 24-HOUR)  mg per 24 hr tablet, Take 1 tablet by mouth daily, Disp: , Rfl:     lovastatin (MEVACOR) 40 MG tablet, 1 tablet, Disp: , Rfl:     metFORMIN (GLUCOPHAGE) 500 mg tablet, 500 mg 2 (two) times a day with meals , Disp: , Rfl:     metFORMIN (GLUCOPHAGE-XR) 500 mg 24 hr tablet, , Disp: , Rfl:     Multiple Vitamin (MULTI-VITAMIN DAILY PO), Take by mouth, Disp: , Rfl:     quinapril (ACCUPRIL) 10 mg tablet, 10 mg daily , Disp: , Rfl:     cyclobenzaprine (FLEXERIL) 10 mg tablet, Take 10 mg by mouth daily as needed, Disp: , Rfl:     diclofenac potassium (CATAFLAM) 50 mg tablet, Take 50 mg by mouth Three times a day, Disp: , Rfl:     fexofenadine (ALLEGRA) 60 MG tablet, Take 60 mg by mouth, Disp: , Rfl:     hydrochlorothiazide (MICROZIDE) 12 5 mg capsule, 12 5 mg 4 times a week, Disp: , Rfl:     lovastatin (MEVACOR) 20 mg tablet, 20 mg daily at bedtime , Disp: , Rfl:     Multiple Vitamins-Minerals (CENTRUM SILVER 50+MEN PO), Centrum Silver, Disp: , Rfl:     Multiple Vitamins-Minerals (CENTRUM SILVER 50+MEN PO), Centrum Silver, Disp: , Rfl:     Allergies   Allergen Reactions    Penicillins        Objective:  BP 132/74 (BP Location: Right arm, Patient Position: Sitting, Cuff Size: Adult)   Pulse 88   Temp 98 1 °F (36 7 °C)   Resp 18   Ht 5' 2 5" (1 588 m)   Wt 74 6 kg (164 lb 8 oz)   SpO2 98%   BMI 29 61 kg/m²    Physical Exam   Constitutional: She is oriented to person, place, and time  She appears well-developed and well-nourished  No distress  HENT:   Head: Normocephalic and atraumatic  Mouth/Throat: Oropharynx is clear and moist  No oropharyngeal exudate  Eyes: Pupils are equal, round, and reactive to light  EOM are normal  No scleral icterus  Neck: Normal range of motion  Cardiovascular: Normal rate and regular rhythm  No murmur heard  Pulmonary/Chest: Effort normal and breath sounds normal  No respiratory distress  Abdominal: Soft  Bowel sounds are normal  She exhibits no distension  There is no tenderness  Musculoskeletal: Normal range of motion  She exhibits no edema  Lymphadenopathy:     She has no cervical adenopathy  Neurological: She is alert and oriented to person, place, and time  No cranial nerve deficit  Skin: Skin is warm  No rash noted  No pallor  Psychiatric: She has a normal mood and affect  Thought content normal        Result Review  Labs:  No visits with results within 3 Week(s) from this visit     Latest known visit with results is:   Annual Exam on 07/25/2018   Component Date Value Ref Range Status    Case Report 07/25/2018    Final                    Value:Gynecologic Cytology Report                       Case: [id number]                                  Authorizing Provider:  Renata Bo PA-C  Collected:           07/25/2018 1711              Ordering Location:     [address] [place] [address] OB Received:            07/25/2018 1711                                     GYN                                                                          First Screen:          Shahzad Morales                                                                  Specimen: LIQUID-BASED PAP, SCREENING, Cervix                                                        Primary Interpretation 07/25/2018 Negative for intraepithelial lesion or malignancy   Final    Specimen Adequacy 07/25/2018 Satisfactory for evaluation  (See note)   Final    Note 07/25/2018    Final                    Value: This result contains rich text formatting which cannot be displayed here   Additional Information 07/25/2018    Final                    Value: This result contains rich text formatting which cannot be displayed here  Please note: This report has been generated by a voice recognition software system  Therefore there may be syntax, spelling, and/or grammatical errors  Please call if you have any questions

## 2020-07-27 LAB
CLINICAL INFO: NORMAL
DATE PREVIOUS BX: NORMAL
HPV I/H RISK 1 DNA CVX QL PROBE+SIG AMP: NOT DETECTED
LMP START DATE: NORMAL
SL AMB PREV. PAP:: NORMAL
SPECIMEN SOURCE CVX/VAG CYTO: NORMAL
STAT OF ADQ CVX/VAG CYTO-IMP: NORMAL

## 2020-07-28 NOTE — PROGRESS NOTES
Assessment/Plan:    No problem-specific Assessment & Plan notes found for this encounter  Normal gynecological physical examination  Self-breast examination stressed  Mammogram ordered  Discussed regular exercise, healthy diet, importance of vitamin D and calcium supplements  Discussed importance of sun block use during periods of prolonged sun exposure  Patient will be seen in 1 year for routine gynecologic and medical examination  Patient will call office for any problems, concerns, or issues which may arise during the interim  Diagnoses and all orders for this visit:    Encntr for gyn exam (general) (routine) w/o abn findings  -     Thinprep Pap and HR HPV DNA    Pap smear, as part of routine gynecological examination    Breast cancer screening  -     Mammo screening bilateral w 3d & cad; Future    Other orders  -     lovastatin (MEVACOR) 40 MG tablet; 1 tablet  -     metFORMIN (GLUCOPHAGE-XR) 500 mg 24 hr tablet  -     Calcium Carbonate-Vitamin D (CALCIUM-D PO); Calcium + D  -     Multiple Vitamins-Minerals (CENTRUM SILVER 50+MEN PO); Centrum Silver  -     diclofenac potassium (CATAFLAM) 50 mg tablet; Take 50 mg by mouth Three times a day  -     fexofenadine (ALLEGRA) 60 MG tablet; Take 60 mg by mouth  -     loratadine-pseudoephedrine (CLARITIN-D 24-HOUR)  mg per 24 hr tablet; Take 1 tablet by mouth daily  -     cyclobenzaprine (FLEXERIL) 10 mg tablet; Take 10 mg by mouth daily as needed          Normal gynecological physical examination  Self-breast examination stressed  Mammogram ordered  Discussed regular exercise, healthy diet, importance of vitamin D and calcium supplements  Discussed importance of sun block use during periods of prolonged sun exposure  Patient will be seen in 1 year for routine gynecologic and medical examination  Patient will call office for any problems, concerns, or issues which may arise during the interim       Subjective:      Patient ID: Grsi Parra yen a 62 y o  female  Patient is a 71-year-old female who presents today for her annual gynecologic medical examination    Patient denies any vaginal bleeding at this time    She also denies any significant vasomotor symptoms    She denies any significant pelvic or abdominal pain    Patient also reports normal bowel and bladder habits    She denies any headaches, chest pain, shortness of breath fever shakes or chills    Patient denies any COVID-19 symptoms as well including fever, cough, shortness of breath or loss of taste or smell    She is followed for several medical problems including blood pressure, cholesterol and diabetes    Patient is up-to-date with screening colonoscopy    Importance of self-breast examination with stressed to the patient at today's visit and she is up-to-date with screening mammography as well  Appropriate arrangements for her annual screening mammogram replaced into the EMR system at today's visit  The following portions of the patient's history were reviewed and updated as appropriate: allergies, current medications, past family history, past medical history, past social history, past surgical history and problem list     Review of Systems   Constitutional: Negative  Negative for appetite change, diaphoresis, fatigue, fever and unexpected weight change  HENT: Negative  Eyes: Negative  Respiratory: Negative  Cardiovascular: Negative  Followed for blood pressure and cholesterol   Gastrointestinal: Negative  Negative for abdominal pain, blood in stool, constipation, diarrhea, nausea and vomiting  Endocrine: Negative  Negative for cold intolerance and heat intolerance  Followed for diabetes   Genitourinary: Negative  Negative for dysuria, frequency, hematuria, urgency, vaginal bleeding, vaginal discharge and vaginal pain  Musculoskeletal: Negative  Skin: Negative  Allergic/Immunologic: Negative  Neurological: Negative      Hematological: Negative  Negative for adenopathy  Psychiatric/Behavioral: Negative  Objective:      /76   Temp 99 °F (37 2 °C)   Wt 74 4 kg (164 lb)   BMI 29 52 kg/m²          Physical Exam   Constitutional: She is oriented to person, place, and time  She appears well-developed and well-nourished  No distress  HENT:   Head: Normocephalic and atraumatic  Eyes: Pupils are equal, round, and reactive to light  EOM are normal    Neck: Normal range of motion  Neck supple  Cardiovascular: Normal rate, regular rhythm and normal heart sounds  Exam reveals no gallop and no friction rub  No murmur heard  Pulmonary/Chest: Effort normal and breath sounds normal  Right breast exhibits no inverted nipple, no mass, no nipple discharge, no skin change and no tenderness  Left breast exhibits no inverted nipple, no mass, no nipple discharge, no skin change and no tenderness  Breasts are symmetrical    Abdominal: Soft  Normal appearance and bowel sounds are normal    Genitourinary: Rectum normal, vagina normal and uterus normal  Rectal exam shows guaiac negative stool  Pelvic exam was performed with patient supine  There is no rash or lesion on the right labia  There is no rash or lesion on the left labia  Uterus is not enlarged and not tender  Cervix exhibits no discharge and no friability  Right adnexum displays no mass, no tenderness and no fullness  Left adnexum displays no mass, no tenderness and no fullness  No erythema, tenderness or bleeding in the vagina  No vaginal discharge found  Genitourinary Comments: Pelvic exam revealed minimal atrophic vaginitis  Good pelvic support was confirmed   Musculoskeletal: Normal range of motion  She exhibits no edema  Lymphadenopathy:     She has no cervical adenopathy  She has no axillary adenopathy  Right: No inguinal and no supraclavicular adenopathy present  Left: No inguinal and no supraclavicular adenopathy present     Neurological: She is alert and oriented to person, place, and time  Skin: Skin is warm, dry and intact  No rash noted  She is not diaphoretic  Psychiatric: She has a normal mood and affect   Her speech is normal and behavior is normal  Judgment and thought content normal  Cognition and memory are normal

## 2020-08-27 DIAGNOSIS — Z12.39 BREAST CANCER SCREENING: ICD-10-CM

## 2021-03-12 ENCOUNTER — OFFICE VISIT (OUTPATIENT)
Dept: HEMATOLOGY ONCOLOGY | Facility: CLINIC | Age: 61
End: 2021-03-12
Payer: COMMERCIAL

## 2021-03-12 VITALS
DIASTOLIC BLOOD PRESSURE: 78 MMHG | TEMPERATURE: 97.3 F | WEIGHT: 155 LBS | HEART RATE: 94 BPM | HEIGHT: 63 IN | RESPIRATION RATE: 16 BRPM | BODY MASS INDEX: 27.46 KG/M2 | OXYGEN SATURATION: 98 % | SYSTOLIC BLOOD PRESSURE: 110 MMHG

## 2021-03-12 DIAGNOSIS — D75.1 ERYTHROCYTOSIS: Primary | ICD-10-CM

## 2021-03-12 DIAGNOSIS — D75.839 THROMBOCYTOSIS: ICD-10-CM

## 2021-03-12 PROCEDURE — 99214 OFFICE O/P EST MOD 30 MIN: CPT | Performed by: PHYSICIAN ASSISTANT

## 2021-03-12 RX ORDER — ASPIRIN 81 MG/1
81 TABLET, CHEWABLE ORAL DAILY
COMMUNITY

## 2021-03-12 RX ORDER — FERROUS SULFATE 325(65) MG
325 TABLET ORAL
COMMUNITY

## 2021-03-12 NOTE — PROGRESS NOTES
[address] [facility] [address]  Hematology Ambulatory Follow-Up  Toña Dunn, 1960, [id number]  3/12/2021    Assessment/Plan:    1  Erythrocytosis; 2  Thrombocytosis ([address] )  This is a 67-year-old female who was initially referred to the Hematology and Oncology Department due to the above CBC abnormalities  Patient underwent extensive workup that included blood testing for myeloproliferative disorders which all returned negative  Patient's etiology erythrocytosis is likely related to hemoconcentration from prior diuretic use  Patient has been able to correct concentration by hydrating prior to blood testing  Patient's hematocrit remains stable and low in the 41% range  Likewise, patient's platelet count has remained in the 300,000 per unit L range  This is considered normal   Variation in lab and upper limit of normal lab can vary  As long as the platelet count remains stable, there is no need for additional workup  I discussed the above with the patient today  As the patient's laboratory results have remained persistently stable, I do not believe that there is a reason for her to continue to follow-up hematology  We discussed signs and symptoms of progressive issues with these blood counts  Patient will follow-up with primary care doctor and is in the future is necessary for follow-up, she is to call to make an appointment  Likewise, patient developed DVT or thrombosis episode, follow-up with Hematology would also be warranted at that time  Patient voiced agreement and understanding to the above  Patient knows to call the Hematology/Oncology office with any questions and concerns regarding the above      Barrier(s) to care: None  The patient is able to self care   ------------------------------------------------------------------------------------------------------    Chief Complaint   Patient presents with    Follow-up     Erythrocytosis     History of present illness:  This is a 50-year-old with past medical history of  hypertension, persisting protein urea/mild underlying kidney disease, anxiety with recent erythrocytosis that developed between 2010 through 2015, who after routine blood work in January 2019 was referred to Hematology after persisting elevated hemoglobin and hematocrit        March 2007: [facility] = 4 9, hemoglobin = 12 6, hematocrit = 37 5, platelet count = 923, MCV = 85, RDW = 14 3, differential = W and L     September 2010:  White blood cell count = 7 8, hematocrit = 33 4, hemoglobin = 10 7, platelet count = 012, MCV = 74, RDW = 15 6, differential = WNL, positive microcytosis in hypochromasia      Patient notes that around September 2010 she was having significant issues with increased vaginal bleeding secondary to perimenopausal irregular menstrual bleeding   Patient was started on a an oral iron supplement   Patient eventually had a uterine ablation   Patient became menopausal 2 years after   Interestingly during this time, patient's platelet count was elevated and her iron indices demonstrate significant iron deficiency anemia   This explains the patient's history of mild increase in platelet count      October 2015:  WBC = 6 6, hemoglobin = 15 0, hematocrit = 43 8, platelet count = 238     Patient believes that she had a home sleep apnea study completed sometime in early 2016 approximately 3 years from Hematology evaluation   Sleep study did demonstrate that she have obstructive sleep apnea, however the patient did not want asleep with the CPAP or BiPAP and she disregarded instructions to follow up for machine      April 2016: [facility] = 6 6, hemoglobin = 14 6, hematocrit = 43 1, platelet count = 113     12/2018:  WBC = 7 9, hemoglobin = 15 1, hematocrit = 45 0, platelet count = 599, RBC indices = WNL, urinalysis demonstrates mild protein, elevated leukocytes and few bacteria without nitrates, blood, ketones or glucose      January 25, 2019 :  WBC 6 6, hemoglobin 14 8, hematocrit 43 8, platelet count 666, normal differential   Previous blood work going back to October 2015 showed similar values in both hemoglobin/hematocrit and platelet counts      March 2019:  Patient underwent evaluation with Hematology   ALEX 2, MPL and CALR mutation testing was negative, NANNETTE, RF were negative, inflammation markers including sed rate and CRP were both within normal limits   Erythropoietin level was low normal at 9 December 2019:  WBC = 7 4 Hemoglobin = 15 4, hematocrit = 45 1, platelet count = 512      February 2020: WBC = 8 1, hemoglobin = 15 1, hematocrit = 44 1%, platelet count = 108      July 2020:  WBC = 8 6, hemoglobin = 15 0, hematocrit = 44 3, platelet count = 395, urinalysis negative for occult blood; Positive for protein leukocyte esterase and bacteria  March 2021:  WBC = 7 1, hemoglobin = 13 7, hematocrit = 41 8%, platelet count 422    Interval history:  No major interval changes  Patient overall feeling well  Review of Systems   Constitutional: Negative for appetite change, fatigue, fever and unexpected weight change  HENT: Negative for nosebleeds  Respiratory: Negative for cough, choking and shortness of breath  Negative hemoptysis  Cardiovascular: Negative for chest pain, palpitations and leg swelling  Gastrointestinal: Negative  Negative for abdominal distention, abdominal pain, anal bleeding, blood in stool, constipation, diarrhea, nausea and vomiting  Endocrine: Negative  Negative for cold intolerance  Genitourinary: Negative  Negative for hematuria, menstrual problem, vaginal bleeding, vaginal discharge and vaginal pain  Musculoskeletal: Negative  Negative for arthralgias, myalgias, neck pain and neck stiffness  Skin: Negative  Negative for color change, pallor and rash  Allergic/Immunologic: Negative  Negative for immunocompromised state     Neurological: Negative  Negative for weakness and headaches  Hematological: Negative for adenopathy  Does not bruise/bleed easily  All other systems reviewed and are negative  Patient Active Problem List   Diagnosis    Erythrocytosis    Thrombocytosis ([address] )       No past medical history on file      Past Surgical History:   Procedure Laterality Date    GALLBLADDER SURGERY  2013    removal    SHOULDER SURGERY  2015    roatator cuff       Family History   Problem Relation Age of Onset    Diabetes Mother     Heart disease Mother        Social History     Socioeconomic History    Marital status: /Civil Union     Spouse name: None    Number of children: None    Years of education: None    Highest education level: None   Occupational History    None   Social Needs    Financial resource strain: None    Food insecurity     Worry: None     Inability: None    Transportation needs     Medical: None     Non-medical: None   Tobacco Use    Smoking status: Never Smoker    Smokeless tobacco: Never Used   Substance and Sexual Activity    Alcohol use: Yes     Comment: socially    Drug use: No    Sexual activity: Yes   Lifestyle    Physical activity     Days per week: None     Minutes per session: None    Stress: None   Relationships    Social connections     Talks on phone: None     Gets together: None     Attends Shinto service: None     Active member of club or organization: None     Attends meetings of clubs or organizations: None     Relationship status: None    Intimate partner violence     Fear of current or ex partner: None     Emotionally abused: None     Physically abused: None     Forced sexual activity: None   Other Topics Concern    None   Social History Narrative    None         Current Outpatient Medications:     ALPRAZolam (XANAX) 0 25 mg tablet, 0 25 mg daily , Disp: , Rfl:     aspirin 81 mg chewable tablet, Chew 81 mg daily, Disp: , Rfl:     Biotin 36800 MCG TABS, Take by mouth 4 (four) times a week , Disp: , Rfl:     CALCIUM-VITAMIN D PO, Take 1,200 Units by mouth 3 (three) times a week , Disp: , Rfl:     ferrous sulfate 325 (65 Fe) mg tablet, Take 325 mg by mouth daily with breakfast, Disp: , Rfl:     loratadine-pseudoephedrine (CLARITIN-D 24-HOUR)  mg per 24 hr tablet, Take 1 tablet by mouth daily, Disp: , Rfl:     lovastatin (MEVACOR) 40 MG tablet, 1 tablet, Disp: , Rfl:     metFORMIN (GLUCOPHAGE) 500 mg tablet, 500 mg 2 (two) times a day with meals , Disp: , Rfl:     Multiple Vitamin (MULTI-VITAMIN DAILY PO), Take by mouth, Disp: , Rfl:     quinapril (ACCUPRIL) 10 mg tablet, 10 mg daily , Disp: , Rfl:     Calcium Carbonate-Vitamin D (CALCIUM-D PO), Calcium + D, Disp: , Rfl:     CINNAMON PO, Take 1,000 mg by mouth 4 (four) times a week, Disp: , Rfl:     cyclobenzaprine (FLEXERIL) 10 mg tablet, Take 10 mg by mouth daily as needed, Disp: , Rfl:     diclofenac potassium (CATAFLAM) 50 mg tablet, Take 50 mg by mouth Three times a day, Disp: , Rfl:     fexofenadine (ALLEGRA) 60 MG tablet, Take 60 mg by mouth, Disp: , Rfl:     hydrochlorothiazide (MICROZIDE) 12 5 mg capsule, 12 5 mg 4 times a week, Disp: , Rfl:     lovastatin (MEVACOR) 20 mg tablet, 20 mg daily at bedtime , Disp: , Rfl:     metFORMIN (GLUCOPHAGE-XR) 500 mg 24 hr tablet, , Disp: , Rfl:     Multiple Vitamins-Minerals (CENTRUM SILVER 50+MEN PO), Centrum Silver, Disp: , Rfl:     Multiple Vitamins-Minerals (CENTRUM SILVER 50+MEN PO), Centrum Silver, Disp: , Rfl:     Allergies   Allergen Reactions    Penicillins        Objective:  /78 (BP Location: Right arm, Patient Position: Sitting, Cuff Size: Adult)   Pulse 94   Temp (!) 97 3 °F (36 3 °C)   Resp 16   Ht 5' 2 5" (1 588 m)   Wt 70 3 kg (155 lb)   SpO2 98%   BMI 27 90 kg/m²    Physical Exam  Constitutional:       General: She is not in acute distress  Appearance: She is well-developed  HENT:      Head: Normocephalic and atraumatic     Eyes: General: No scleral icterus  Pupils: Pupils are equal, round, and reactive to light  Cardiovascular:      Rate and Rhythm: Normal rate and regular rhythm  Heart sounds: No murmur  Pulmonary:      Effort: Pulmonary effort is normal  No respiratory distress  Skin:     General: Skin is warm  Coloration: Skin is not pale  Findings: No rash  Neurological:      Mental Status: She is alert and oriented to person, place, and time  Psychiatric:         Thought Content: Thought content normal          Result Review  Labs:  CBC WITH DIFF (03/02/2021 10:11 AM EST)  CBC WITH DIFF (03/02/2021 10:11 AM EST)   Component Value Ref Range Performed At Pathologist Signature   Hemoglobin 13 7 11 5 - 14 5 g/dL [facility]     Hematocrit 41 8 35 0 - 43 0 % [facility]     WBC 7 1 4 0 - 10 0 thou/cmm [facility]     RBC 4 57 3 70 - 4 70 mill/cmm [facility]     Platelet Count 757 (H) 140 - 350 thou/cmm [facility]     MPV 8 8 7 5 - 11 3 fL [facility]     MCV 92 80 - 100 fL [facility]     MCH 29 9 26 0 - 34 0 pg [facility]     MCHC 32 7 32 0 - 37 0 g/dL [facility]     RDW 12 9 12 0 - 16 0 % [facility]     Differential Type AUTO   [facility]     Absolute Neutrophils 3 7 1 8 - 7 8 thou/cmm [facility]     Absolute Lymphocytes 2 4 1 0 - 3 0 thou/cmm [facility]     Absolute Monocytes 0 5 0 3 - 1 0 thou/cmm [facility]     Absolute Eosinophils 0 4 0 0 - 0 5 thou/cmm [facility]     Absolute Basophils 0 1 0 0 - 0 1 thou/cmm [facility]     Neutrophils 51 % [facility]     Lymphocytes 34 % [facility]     Monocytes 8 % [facility]     Eosinophils 6 % [facility]     Basophils 1 % [facility]           Please note: This report has been generated by a voice recognition software system   Therefore there may be syntax, spelling, and/or grammatical errors  Please call if you have any questions

## 2021-03-29 ENCOUNTER — TELEPHONE (OUTPATIENT)
Dept: HEMATOLOGY ONCOLOGY | Facility: MEDICAL CENTER | Age: 61
End: 2021-03-29

## 2021-03-29 NOTE — TELEPHONE ENCOUNTER
Patient states that she was billed for 03/12/2021 as an office visit - patient was charge  $200 00 when it should of been a follow up visit     Please call patient back to discuss how we are inputting the type of visit a good call back [phone]

## 2021-03-29 NOTE — TELEPHONE ENCOUNTER
Patient returned my call  She had concerns regarding how her office visit was billed  I explained that it was billed the same way her previous follow up visits were, with the only variation being time spent in the room with patient  She was upset that she received a bill for this visit when it was simply the provider reviewing her lab results and stating that they are okay and for her to follow up in a few months  I stated that her questions may better be addressed by the billing department, however, she stated that the billing department told her to contact the physician's office  She asked what would be billed for a regular telephone call or a virtual visit  I explained that I do not know how much is billed for these types of visits, as the allowed billable amount varies based on insurance company  Patient asked who she can speak to about getting the bill reduced  I advised that I would reach out to [employer] to have them contact her regarding this bill  She voiced understanding  Email was sent to [employer]

## 2021-04-26 ENCOUNTER — OFFICE VISIT (OUTPATIENT)
Dept: URGENT CARE | Age: 61
End: 2021-04-26
Payer: COMMERCIAL

## 2021-04-26 VITALS
DIASTOLIC BLOOD PRESSURE: 67 MMHG | SYSTOLIC BLOOD PRESSURE: 115 MMHG | TEMPERATURE: 98.3 F | HEART RATE: 84 BPM | OXYGEN SATURATION: 97 % | RESPIRATION RATE: 18 BRPM

## 2021-04-26 DIAGNOSIS — R20.2 PARESTHESIA OF LEFT LEG: Primary | ICD-10-CM

## 2021-04-26 PROCEDURE — G0382 LEV 3 HOSP TYPE B ED VISIT: HCPCS | Performed by: NURSE PRACTITIONER

## 2021-04-26 NOTE — PROGRESS NOTES
[facility]        NAME: Elizabeth Gutierrez is a 62 y o  female  : 1960    MRN: [id number]  DATE: 2021  TIME: 12:50 PM    Assessment and Plan   Paresthesia of left leg [R20 2]  1  Paresthesia of left leg           Patient Instructions     Follow up with PCP  If area of numbness or swelling increase, consider an U/S or other eval tool  No need for RD visit at this time  Follow up with PCP in 1-2 days  Proceed to  ER if symptoms worsen  Chief Complaint     Chief Complaint   Patient presents with    Leg Pain     left leg numbness x yesterday          History of Present Illness       HPI   Reports numbness of the left leg  New onset, starting yesterday  No similar previous occurrence  Hx of DM II x 25 yrs  Denies trauma  No swelling of the calf area  Review of Systems   Review of Systems   Constitutional: Negative for chills and fever  Musculoskeletal: Negative for arthralgias, gait problem and myalgias  Skin: Negative for color change, rash and wound  Neurological: Positive for numbness (left leg)  Negative for tremors, seizures, speech difficulty, weakness and light-headedness           Current Medications       Current Outpatient Medications:     ALPRAZolam (XANAX) 0 25 mg tablet, 0 25 mg daily , Disp: , Rfl:     aspirin 81 mg chewable tablet, Chew 81 mg daily, Disp: , Rfl:     Biotin 94816 MCG TABS, Take by mouth 4 (four) times a week , Disp: , Rfl:     Calcium Carbonate-Vitamin D (CALCIUM-D PO), Calcium + D, Disp: , Rfl:     CALCIUM-VITAMIN D PO, Take 1,200 Units by mouth 3 (three) times a week , Disp: , Rfl:     CINNAMON PO, Take 1,000 mg by mouth 4 (four) times a week, Disp: , Rfl:     cyclobenzaprine (FLEXERIL) 10 mg tablet, Take 10 mg by mouth daily as needed, Disp: , Rfl:     diclofenac potassium (CATAFLAM) 50 mg tablet, Take 50 mg by mouth Three times a day, Disp: , Rfl:     ferrous sulfate 325 (65 Fe) mg tablet, Take 325 mg by mouth daily with breakfast, Disp: , Rfl:     fexofenadine (ALLEGRA) 60 MG tablet, Take 60 mg by mouth, Disp: , Rfl:     hydrochlorothiazide (MICROZIDE) 12 5 mg capsule, 12 5 mg 4 times a week, Disp: , Rfl:     loratadine-pseudoephedrine (CLARITIN-D 24-HOUR)  mg per 24 hr tablet, Take 1 tablet by mouth daily, Disp: , Rfl:     lovastatin (MEVACOR) 20 mg tablet, 20 mg daily at bedtime , Disp: , Rfl:     lovastatin (MEVACOR) 40 MG tablet, 1 tablet, Disp: , Rfl:     metFORMIN (GLUCOPHAGE) 500 mg tablet, 500 mg 2 (two) times a day with meals , Disp: , Rfl:     metFORMIN (GLUCOPHAGE-XR) 500 mg 24 hr tablet, , Disp: , Rfl:     Multiple Vitamin (MULTI-VITAMIN DAILY PO), Take by mouth, Disp: , Rfl:     Multiple Vitamins-Minerals (CENTRUM SILVER 50+MEN PO), Centrum Silver, Disp: , Rfl:     Multiple Vitamins-Minerals (CENTRUM SILVER 50+MEN PO), Centrum Silver, Disp: , Rfl:     quinapril (ACCUPRIL) 10 mg tablet, 10 mg daily , Disp: , Rfl:     Current Allergies     Allergies as of 04/26/2021 - Reviewed 04/26/2021   Allergen Reaction Noted    Penicillins  07/24/2013            The following portions of the patient's history were reviewed and updated as appropriate: allergies, current medications, past family history, past medical history, past social history, past surgical history and problem list      History reviewed  No pertinent past medical history  Past Surgical History:   Procedure Laterality Date    GALLBLADDER SURGERY  2013    removal    SHOULDER SURGERY  2015    roatator cuff       Family History   Problem Relation Age of Onset    Diabetes Mother     Heart disease Mother          Medications have been verified  Objective   /67   Pulse 84   Temp 98 3 °F (36 8 °C)   Resp 18   SpO2 97%   No LMP recorded  Patient has had an ablation  Physical Exam     Physical Exam  Musculoskeletal:         General: Swelling (there is an area of about 7 cm in longest diamter on the upper third of the left shin   Area is marked with a skin pen-marker  Normal color as surrouding skin  NTTP  No increase warmth) present  Skin:     Coloration: Skin is not jaundiced or pale  Findings: No bruising, erythema, lesion or rash  Neurological:      Motor: No weakness        Gait: Gait normal

## 2021-04-26 NOTE — PATIENT INSTRUCTIONS
Paresthesia   WHAT YOU NEED TO KNOW:   Paresthesia is numbness, tingling, or burning  It can happen in any part of your body, but usually occurs in your legs, feet, arms, or hands  DISCHARGE INSTRUCTIONS:   Return to the emergency department if:   · You have severe pain along with numbness and tingling  · Your legs suddenly become cold  You have trouble moving your legs, and they ache  · You have increased weakness in a part of your body  · You have uncontrolled movements  Contact your healthcare provider or neurologist if:   · Your symptoms do not improve  · You have symptoms in more than one part of your body  · You have questions or concerns about your condition or care  Manage paresthesia:   · Protect the area from injury  You may injure or burn yourself if you lose feeling in the area  Be careful when you touch anything that could be hot  Wear sturdy shoes to protect your feet  Ask about other ways to protect yourself  · Go to physical or occupational therapy if directed  Your provider may recommend therapy if you have a condition such as carpal tunnel syndrome  A physical therapist can teach you exercises to help strengthen the area or increase your ability to move it  An occupational therapist can help you find new ways to do your daily activities  · Manage health conditions that can cause paresthesia  Work with your diabetes specialist if you have uncontrolled diabetes  A dietitian or your healthcare provider can help you create a meal plan if you have low vitamin B levels  Your provider can help you manage your health if you have multiple sclerosis or you had a stroke  It is important to manage health conditions to stop paresthesia or prevent it from getting worse  Follow up with your healthcare provider or neurologist as directed: Your healthcare provider may refer you to a specialist  Write down your questions so you remember to ask them during your visits    © Copyright [address] Information is for [employer] use only and may not be sold, redistributed or otherwise used for commercial purposes  All illustrations and images included in CareNotes® are the copyrighted property of [employer]  or [employer]  The above information is an  only  It is not intended as medical advice for individual conditions or treatments  Talk to your doctor, nurse or pharmacist before following any medical regimen to see if it is safe and effective for you

## 2021-07-16 ENCOUNTER — ANNUAL EXAM (OUTPATIENT)
Dept: OBGYN CLINIC | Facility: CLINIC | Age: 61
End: 2021-07-16
Payer: COMMERCIAL

## 2021-07-16 VITALS
HEIGHT: 63 IN | BODY MASS INDEX: 27.29 KG/M2 | WEIGHT: 154 LBS | DIASTOLIC BLOOD PRESSURE: 74 MMHG | SYSTOLIC BLOOD PRESSURE: 138 MMHG

## 2021-07-16 DIAGNOSIS — Z01.419 PAP SMEAR, AS PART OF ROUTINE GYNECOLOGICAL EXAMINATION: ICD-10-CM

## 2021-07-16 DIAGNOSIS — Z12.31 ENCOUNTER FOR SCREENING MAMMOGRAM FOR MALIGNANT NEOPLASM OF BREAST: ICD-10-CM

## 2021-07-16 DIAGNOSIS — Z01.419 ENCNTR FOR GYN EXAM (GENERAL) (ROUTINE) W/O ABN FINDINGS: Primary | ICD-10-CM

## 2021-07-16 PROCEDURE — 99396 PREV VISIT EST AGE 40-64: CPT | Performed by: OBSTETRICS & GYNECOLOGY

## 2021-07-16 PROCEDURE — G0145 SCR C/V CYTO,THINLAYER,RESCR: HCPCS | Performed by: OBSTETRICS & GYNECOLOGY

## 2021-07-16 RX ORDER — QUINAPRIL 20 MG/1
TABLET ORAL
COMMUNITY
Start: 2021-05-10

## 2021-07-16 NOTE — PROGRESS NOTES
Assessment/Plan:    No problem-specific Assessment & Plan notes found for this encounter  Diagnoses and all orders for this visit:    Encntr for gyn exam (general) (routine) w/o abn findings  -     Liquid-based pap, screening    Pap smear, as part of routine gynecological examination    Encounter for screening mammogram for malignant neoplasm of breast  -     Mammo screening bilateral w 3d & cad; Future    Other orders  -     quinapril (ACCUPRIL) 20 mg tablet          Normal gynecological physical examination  Self-breast examination stressed  Mammogram ordered  Discussed regular exercise, healthy diet, importance of vitamin D and calcium supplements  Discussed importance of sun block use during periods of prolonged sun exposure  Patient will be seen in 1 year for routine gynecologic and medical examination  Patient will call office for any problems, concerns, or issues which may arise during the interim  Subjective:      Patient ID: Dru Amado is a 62 y o  female  Patient is a 81-year-old female who presents today for her gynecologic and medical examination     Patient denies any vaginal bleeding at this time     She also denies any significant vasomotor symptoms other than decreased libido  I suggested that she visit [facility] for natural alternative treatments for her  Patient denies any significant pelvic or abdominal pain     She reports normal appetite     She also reports normal bowel and bladder habits     Patient denies any headaches, chest pain, shortness of breath fever shakes or chills     She also denies any COVID-19 symptoms including cough or loss of taste or smell     She is fully vaccinated for COVID at this time  She is followed medically for diabetes and is well controlled     Patient is up-to-date with screening colonoscopy    Importance of self-breast examination with stressed to the patient and she is up-to-date with screening mammography as well  Appropriate arrangements for her annual screening mammogram replaced into the EMR system at today's visit  The following portions of the patient's history were reviewed and updated as appropriate: allergies, current medications, past family history, past medical history, past social history, past surgical history and problem list     Review of Systems   Constitutional: Negative  Negative for appetite change, diaphoresis, fatigue, fever and unexpected weight change  HENT: Negative  Eyes: Negative  Respiratory: Negative  Cardiovascular: Negative  Followed for blood pressure and cholesterol   Gastrointestinal: Negative  Negative for abdominal pain, blood in stool, constipation, diarrhea, nausea and vomiting  Endocrine: Negative  Negative for cold intolerance and heat intolerance  Followed for diabetes   Genitourinary: Negative  Negative for dysuria, frequency, hematuria, urgency, vaginal bleeding, vaginal discharge and vaginal pain  Musculoskeletal: Negative  Skin: Negative  Allergic/Immunologic: Negative  Neurological: Negative  Hematological: Negative  Negative for adenopathy  Psychiatric/Behavioral: Negative  Objective:      /74 (BP Location: Left arm, Patient Position: Sitting, Cuff Size: Standard)   Ht 5' 2 5" (1 588 m)   Wt 69 9 kg (154 lb)   BMI 27 72 kg/m²          Physical Exam  Constitutional:       General: She is not in acute distress  Appearance: Normal appearance  She is well-developed  She is not diaphoretic  HENT:      Head: Normocephalic and atraumatic  Eyes:      Pupils: Pupils are equal, round, and reactive to light  Cardiovascular:      Rate and Rhythm: Normal rate and regular rhythm  Heart sounds: Normal heart sounds  No murmur heard  No friction rub  No gallop  Pulmonary:      Effort: Pulmonary effort is normal       Breath sounds: Normal breath sounds     Chest:      Breasts: Breasts are symmetrical  Right: No inverted nipple, mass, nipple discharge, skin change or tenderness  Left: No inverted nipple, mass, nipple discharge, skin change or tenderness  Abdominal:      General: Bowel sounds are normal       Palpations: Abdomen is soft  Genitourinary:     General: Normal vulva  Exam position: Supine  Labia:         Right: No rash or lesion  Left: No rash or lesion  Urethra: No urethral swelling or urethral lesion  Vagina: Normal  No vaginal discharge, erythema, tenderness or bleeding  Cervix: No discharge or friability  Uterus: Not enlarged and not tender  Adnexa:         Right: No mass, tenderness or fullness  Left: No mass, tenderness or fullness  Rectum: Normal  Guaiac result negative  Comments: Pelvic exam revealed minimal atrophic vaginitis   Good pelvic support confirmed  Musculoskeletal:         General: Normal range of motion  Cervical back: Normal range of motion and neck supple  Lymphadenopathy:      Cervical: No cervical adenopathy  Upper Body:      Right upper body: No supraclavicular adenopathy  Left upper body: No supraclavicular adenopathy  Skin:     General: Skin is warm and dry  Findings: No rash  Neurological:      Mental Status: She is alert and oriented to person, place, and time  Psychiatric:         Mood and Affect: Mood normal          Speech: Speech normal          Behavior: Behavior normal          Thought Content:  Thought content normal          Judgment: Judgment normal

## 2021-07-23 LAB
LAB AP GYN PRIMARY INTERPRETATION: NORMAL
Lab: NORMAL

## 2022-07-21 ENCOUNTER — ANNUAL EXAM (OUTPATIENT)
Dept: OBGYN CLINIC | Facility: CLINIC | Age: 62
End: 2022-07-21
Payer: COMMERCIAL

## 2022-07-21 VITALS
HEIGHT: 63 IN | DIASTOLIC BLOOD PRESSURE: 90 MMHG | WEIGHT: 158 LBS | SYSTOLIC BLOOD PRESSURE: 140 MMHG | BODY MASS INDEX: 28 KG/M2

## 2022-07-21 DIAGNOSIS — N95.2 ATROPHIC VAGINITIS: ICD-10-CM

## 2022-07-21 DIAGNOSIS — Z01.419 PAP SMEAR, AS PART OF ROUTINE GYNECOLOGICAL EXAMINATION: ICD-10-CM

## 2022-07-21 DIAGNOSIS — Z12.31 ENCOUNTER FOR SCREENING MAMMOGRAM FOR BREAST CANCER: Primary | ICD-10-CM

## 2022-07-21 DIAGNOSIS — Z01.419 ENCOUNTER FOR GYNECOLOGICAL EXAMINATION WITHOUT ABNORMAL FINDING: ICD-10-CM

## 2022-07-21 PROCEDURE — 99396 PREV VISIT EST AGE 40-64: CPT | Performed by: OBSTETRICS & GYNECOLOGY

## 2022-07-21 PROCEDURE — G0145 SCR C/V CYTO,THINLAYER,RESCR: HCPCS | Performed by: OBSTETRICS & GYNECOLOGY

## 2022-07-21 PROCEDURE — 0503F POSTPARTUM CARE VISIT: CPT | Performed by: OBSTETRICS & GYNECOLOGY

## 2022-07-21 NOTE — PROGRESS NOTES
Assessment/Plan:    No problem-specific Assessment & Plan notes found for this encounter  Diagnoses and all orders for this visit:    Encounter for screening mammogram for breast cancer  -     Mammo screening bilateral w 3d & cad; Future    Encounter for gynecological examination without abnormal finding  -     Liquid-based pap, screening    Pap smear, as part of routine gynecological examination    Atrophic vaginitis          Normal gynecological physical examination  Self-breast examination stressed  Mammogram ordered  Discussed regular exercise, healthy diet, importance of vitamin D and calcium supplements  Discussed importance of sun block use during periods of prolonged sun exposure  Patient will be seen in 1 year for routine gynecologic and medical examination  Patient will call office for any problems, concerns, or issues which may arise during the interim  Subjective:          Radha Mack, a 62yoF, is here for her annual visit  No concerns today  Patient ID: Nydia Meek is a 65 y o  female who presents today for her annual gynecologic and medical examination    Menstrual status: postmenopausal, no bleeding/discharge  Vasomotor symptoms: Admits to continued decreased libido, plans to visit [facility] to discuss natural supplement  Denies hot flashes, insomnia, or other symptoms  Patient reports normal appetite    Patient reports normal bowel and bladder habits    Patient denies any significant pelvic or abdominal pain    Patient denies any headaches, chest pain, shortness of breath fever shakes or chills    Patient denies any COVID 19 symptoms including cough or loss of taste or smell    COVID vaccine status: vaccinated    Medical problems: Followed for diabetes, cholesterol, hypertension  Colonoscopy status: Completes [facility]oGuard yearly  Mammogram status: Up to date  Will place order at today's visit      The following portions of the patient's history were reviewed and updated as appropriate: allergies, current medications, past family history, past medical history, past social history, past surgical history and problem list     Review of Systems   Constitutional: Negative  Negative for appetite change, diaphoresis, fatigue, fever and unexpected weight change  HENT: Negative  Eyes: Negative  Negative for visual disturbance  Respiratory: Negative  Negative for shortness of breath  Cardiovascular: Negative  Negative for chest pain  Followed for blood pressure/cholesterol  Gastrointestinal: Negative  Negative for abdominal pain, blood in stool, constipation, diarrhea, nausea and vomiting  Endocrine: Negative  Negative for cold intolerance and heat intolerance  Followed for diabetes   Genitourinary: Negative  Negative for dysuria, frequency, hematuria, urgency, vaginal bleeding, vaginal discharge and vaginal pain  Musculoskeletal: Negative  Skin: Negative  Allergic/Immunologic: Negative  Neurological: Negative  Negative for dizziness and headaches  Hematological: Negative  Negative for adenopathy  Psychiatric/Behavioral: Negative  Negative for agitation, decreased concentration and sleep disturbance  Objective:      /90   Ht 5' 2 5" (1 588 m)   Wt 71 7 kg (158 lb)   BMI 28 44 kg/m²          Physical Exam  Constitutional:       General: She is not in acute distress  Appearance: Normal appearance  She is well-developed  She is not diaphoretic  HENT:      Head: Normocephalic and atraumatic  Eyes:      Pupils: Pupils are equal, round, and reactive to light  Cardiovascular:      Rate and Rhythm: Normal rate and regular rhythm  Heart sounds: Normal heart sounds  No murmur heard  No friction rub  No gallop  Pulmonary:      Effort: Pulmonary effort is normal       Breath sounds: Normal breath sounds     Chest:   Breasts: Breasts are symmetrical       Right: No inverted nipple, mass, nipple discharge, skin change, tenderness or supraclavicular adenopathy  Left: No inverted nipple, mass, nipple discharge, skin change, tenderness or supraclavicular adenopathy  Abdominal:      General: Bowel sounds are normal       Palpations: Abdomen is soft  Genitourinary:     General: Normal vulva  Exam position: Supine  Labia:         Right: No rash or lesion  Left: No rash or lesion  Vagina: Normal  No vaginal discharge, erythema, tenderness or bleeding  Cervix: No discharge or friability  Uterus: Not enlarged and not tender  Adnexa:         Right: No mass, tenderness or fullness  Left: No mass, tenderness or fullness  Rectum: Normal  Guaiac result negative  Comments: Pelvic exam revealed moderate atrophic vaginitis  Good pelvic support confirmed  Musculoskeletal:         General: Normal range of motion  Cervical back: Normal range of motion and neck supple  Lymphadenopathy:      Cervical: No cervical adenopathy  Upper Body:      Right upper body: No supraclavicular adenopathy  Left upper body: No supraclavicular adenopathy  Skin:     General: Skin is warm and dry  Findings: No rash  Neurological:      General: No focal deficit present  Mental Status: She is alert and oriented to person, place, and time  Psychiatric:         Mood and Affect: Mood normal          Speech: Speech normal          Behavior: Behavior normal          Thought Content:  Thought content normal          Judgment: Judgment normal

## 2022-07-27 LAB
LAB AP GYN PRIMARY INTERPRETATION: NORMAL
Lab: NORMAL

## 2022-09-09 DIAGNOSIS — Z12.31 ENCOUNTER FOR SCREENING MAMMOGRAM FOR BREAST CANCER: ICD-10-CM

## 2023-07-20 ENCOUNTER — ANNUAL EXAM (OUTPATIENT)
Dept: OBGYN CLINIC | Facility: CLINIC | Age: 63
End: 2023-07-20
Payer: COMMERCIAL

## 2023-07-20 VITALS
WEIGHT: 166.4 LBS | DIASTOLIC BLOOD PRESSURE: 78 MMHG | SYSTOLIC BLOOD PRESSURE: 132 MMHG | HEIGHT: 63 IN | BODY MASS INDEX: 29.48 KG/M2

## 2023-07-20 DIAGNOSIS — Z01.419 ENCOUNTER FOR GYNECOLOGICAL EXAMINATION WITHOUT ABNORMAL FINDING: ICD-10-CM

## 2023-07-20 DIAGNOSIS — Z12.31 ENCOUNTER FOR SCREENING MAMMOGRAM FOR BREAST CANCER: Primary | ICD-10-CM

## 2023-07-20 DIAGNOSIS — Z01.419 PAP SMEAR, AS PART OF ROUTINE GYNECOLOGICAL EXAMINATION: ICD-10-CM

## 2023-07-20 DIAGNOSIS — N95.2 ATROPHIC VAGINITIS: ICD-10-CM

## 2023-07-20 PROCEDURE — S0612 ANNUAL GYNECOLOGICAL EXAMINA: HCPCS | Performed by: OBSTETRICS & GYNECOLOGY

## 2023-07-20 PROCEDURE — G0145 SCR C/V CYTO,THINLAYER,RESCR: HCPCS | Performed by: OBSTETRICS & GYNECOLOGY

## 2023-07-20 PROCEDURE — G0476 HPV COMBO ASSAY CA SCREEN: HCPCS | Performed by: OBSTETRICS & GYNECOLOGY

## 2023-07-20 RX ORDER — LORATADINE 10 MG/1
CAPSULE, LIQUID FILLED ORAL
COMMUNITY
Start: 2020-01-01

## 2023-07-20 RX ORDER — VALSARTAN 160 MG/1
TABLET ORAL
COMMUNITY
Start: 2023-01-11

## 2023-07-20 RX ORDER — DOXYCYCLINE HYCLATE 100 MG/1
CAPSULE ORAL
COMMUNITY
Start: 2023-05-06

## 2023-07-20 RX ORDER — ACETAMINOPHEN 160 MG
TABLET,DISINTEGRATING ORAL
COMMUNITY
Start: 2022-01-17

## 2023-07-20 RX ORDER — VALSARTAN 160 MG/1
TABLET ORAL
COMMUNITY
Start: 2023-05-18

## 2023-07-20 RX ORDER — COVID-19 MOLECULAR TEST ASSAY
KIT MISCELLANEOUS
COMMUNITY
Start: 2023-05-08

## 2023-07-20 NOTE — PROGRESS NOTES
Assessment/Plan:    No problem-specific Assessment & Plan notes found for this encounter. Diagnoses and all orders for this visit:    Encounter for screening mammogram for breast cancer  -     Mammo screening bilateral w 3d & cad; Future    Encounter for gynecological examination without abnormal finding  -     Liquid-based pap, screening    Pap smear, as part of routine gynecological examination    Atrophic vaginitis    Other orders  -     Ascorbic Acid 500 MG CHEW; Chew 500 mg (Patient not taking: Reported on 7/20/2023)  -     Calcium-Magnesium-Vitamin D (CALCIUM 1200+D3 PO)  -     Cholecalciferol (Vitamin D3) 50 MCG (2000 UT) capsule  -     BinaxNOW COVID-19 Ag Home Test KIT; TEST AS DIRECTED TODAY (Patient not taking: Reported on 7/20/2023)  -     doxycycline hyclate (VIBRAMYCIN) 100 mg capsule;  (Patient not taking: Reported on 7/20/2023)  -     Loratadine 10 MG CAPS  -     metFORMIN (GLUCOPHAGE) 500 mg tablet  -     valsartan (DIOVAN) 160 mg tablet  -     metFORMIN HCl  MG/5ML SRER  -     valsartan (DIOVAN) 160 mg tablet;  (Patient not taking: Reported on 7/20/2023)          Normal gynecological physical examination. Self-breast examination stressed. Mammogram ordered. Discussed regular exercise, healthy diet, importance of vitamin D and calcium supplements. Discussed importance of sun block use during periods of prolonged sun exposure. Patient will be seen in 1 year for routine gynecologic and medical examination. Patient will call office for any problems, concerns, or issues which may arise during the interim. Subjective:   GI is a 59year old postmenopausal female presenting to the office for an annual wellness visit. She has no concerns/complaints today. She denies any abnormal vaginal discharge, bleeding, irritation, dryness, or general discomfort. Patient denies any changes to the breasts including any pain, tenderness, masses, or nipple discharge.  She tries to stay active and eat well. Follows with primary care and nephrology regularly. She gets lab work done frequently      Patient ID: Carlitos Daley is a 59 y.o. female who presents today for her annual gynecologic and medical examination    Menstrual status: postmenopausal, no bleeding/discharge. Vasomotor symptoms: Admits to continued decreased libido, did not go to [facility] to discuss natural supplement. Denies hot flashes, insomnia, or other symptoms. Patient reports normal appetite    Patient reports normal bowel and bladder habits    Patient denies any significant pelvic or abdominal pain    Patient denies any headaches, chest pain, shortness of breath fever shakes or chills    Patient denies any COVID 19 symptoms including cough or loss of taste or smell    COVID vaccine status: Vaccinated    Medical problems: Followed for diabetes, cholesterol, hypertension. She follows with nephrology regularly for proteinuria    Colonoscopy status: Cologuard last year 2022 with normal results, completes Cologuard yearly    Mammogram status: Last mammo in Sept 2022 without abnormal results. Placed ordered at today's visit    The following portions of the patient's history were reviewed and updated as appropriate: allergies, current medications, past family history, past medical history, past social history, past surgical history and problem list.    Review of Systems   Constitutional: Negative. Negative for appetite change, diaphoresis, fatigue, fever and unexpected weight change. HENT: Negative. Eyes: Negative. Respiratory: Negative. Cardiovascular: Negative. Followed for blood pressure/cholesterol. Gastrointestinal: Negative. Negative for abdominal pain, blood in stool, constipation, diarrhea, nausea and vomiting. Endocrine: Negative. Negative for cold intolerance and heat intolerance. Followed for diabetes    Genitourinary: Negative.   Negative for dysuria, frequency, hematuria, urgency, vaginal bleeding, vaginal discharge and vaginal pain. Follows with nephrology   Musculoskeletal: Negative. Skin: Negative. Allergic/Immunologic: Negative. Neurological: Negative. Hematological: Negative. Negative for adenopathy. Psychiatric/Behavioral: Negative. Objective:      /78   Ht 5' 2.5" (1.588 m)   Wt 75.5 kg (166 lb 6.4 oz)   BMI 29.95 kg/m²          Physical Exam  Constitutional:       General: She is not in acute distress. Appearance: Normal appearance. She is well-developed. She is not diaphoretic. HENT:      Head: Normocephalic and atraumatic. Eyes:      Pupils: Pupils are equal, round, and reactive to light. Cardiovascular:      Rate and Rhythm: Normal rate and regular rhythm. Heart sounds: Normal heart sounds. No murmur heard. No friction rub. No gallop. Pulmonary:      Effort: Pulmonary effort is normal.      Breath sounds: Normal breath sounds. Chest:   Breasts:     Breasts are symmetrical.      Right: No inverted nipple, mass, nipple discharge, skin change or tenderness. Left: No inverted nipple, mass, nipple discharge, skin change or tenderness. Abdominal:      General: Bowel sounds are normal.      Palpations: Abdomen is soft. Genitourinary:     Exam position: Supine. Labia:         Right: No rash or lesion. Left: No rash or lesion. Vagina: Normal. No vaginal discharge, erythema, tenderness or bleeding. Cervix: No discharge or friability. Uterus: Not enlarged and not tender. Adnexa:         Right: No mass, tenderness or fullness. Left: No mass, tenderness or fullness. Rectum: Normal. Guaiac result negative. Comments: Pelvic exam revealed moderate atrophic vaginitis. Good pelvic support confirmed. Musculoskeletal:         General: Normal range of motion. Cervical back: Normal range of motion and neck supple. Lymphadenopathy:      Cervical: No cervical adenopathy.       Upper Body:      Right upper body: No supraclavicular adenopathy. Left upper body: No supraclavicular adenopathy. Skin:     General: Skin is warm and dry. Findings: No rash. Neurological:      General: No focal deficit present. Mental Status: She is alert and oriented to person, place, and time. Psychiatric:         Mood and Affect: Mood normal.         Speech: Speech normal.         Behavior: Behavior normal.         Thought Content:  Thought content normal.         Judgment: Judgment normal.

## 2023-07-20 NOTE — PATIENT INSTRUCTIONS
Normal gynecological physical examination. Self-breast examination stressed. Mammogram ordered. Discussed regular exercise, healthy diet, importance of vitamin D and calcium supplements. Discussed importance of sun block use during periods of prolonged sun exposure. Patient will be seen in 1 year for routine gynecologic and medical examination. Patient will call office for any problems, concerns, or issues which may arise during the interim.

## 2023-07-21 LAB
HPV HR 12 DNA CVX QL NAA+PROBE: NEGATIVE
HPV16 DNA CVX QL NAA+PROBE: NEGATIVE
HPV18 DNA CVX QL NAA+PROBE: NEGATIVE

## 2023-07-26 LAB
LAB AP GYN PRIMARY INTERPRETATION: NORMAL
Lab: NORMAL

## 2023-09-14 DIAGNOSIS — Z12.31 ENCOUNTER FOR SCREENING MAMMOGRAM FOR BREAST CANCER: ICD-10-CM

## 2024-07-22 ENCOUNTER — ANNUAL EXAM (OUTPATIENT)
Dept: OBGYN CLINIC | Facility: CLINIC | Age: 64
End: 2024-07-22
Payer: COMMERCIAL

## 2024-07-22 VITALS — SYSTOLIC BLOOD PRESSURE: 130 MMHG | WEIGHT: 164 LBS | DIASTOLIC BLOOD PRESSURE: 86 MMHG | BODY MASS INDEX: 29.52 KG/M2

## 2024-07-22 DIAGNOSIS — Z01.419 ENCOUNTER FOR GYNECOLOGICAL EXAMINATION WITHOUT ABNORMAL FINDING: Primary | ICD-10-CM

## 2024-07-22 DIAGNOSIS — Z01.419 PAP SMEAR, AS PART OF ROUTINE GYNECOLOGICAL EXAMINATION: ICD-10-CM

## 2024-07-22 DIAGNOSIS — Z12.31 ENCOUNTER FOR SCREENING MAMMOGRAM FOR BREAST CANCER: ICD-10-CM

## 2024-07-22 PROCEDURE — S0612 ANNUAL GYNECOLOGICAL EXAMINA: HCPCS | Performed by: OBSTETRICS & GYNECOLOGY

## 2024-07-22 RX ORDER — EMPAGLIFLOZIN 25 MG/1
TABLET, FILM COATED ORAL
COMMUNITY
Start: 2024-06-02

## 2024-07-22 NOTE — PROGRESS NOTES
Assessment/Plan:     Diagnoses and all orders for this visit:    Encounter for gynecological examination without abnormal finding    Encounter for screening mammogram for breast cancer    Pap smear, as part of routine gynecological examination    Other orders  -     Jardiance 25 MG TABS        Normal gynecological physical examination.  Self-breast examination stressed.  Mammogram ordered.  Discussed regular exercise, healthy diet, importance of vitamin D and calcium supplements.  Discussed importance of sun block use during periods of prolonged sun exposure.  Pap obtained during visit. Will contact patient with the results.   Patient will be seen in 1 year for routine gynecologic and medical examination.  Patient will call office for any problems, concerns, or issues which may arise during the interim.     Subjective:     Patient ID: Eliana Valladares is a 64 y.o. female who presents today for her annual gynecologic and medical examination. She does not have any complaints or concerns for this visit.     Menstrual status: Postmenopausal. Denies bleeding and spotting.    Vasomotor symptoms: Denies vasomotor symptoms.     Patient reports normal appetite    Patient reports normal bowel and bladder habits    Patient denies any significant pelvic or abdominal pain    Patient denies any headaches, chest pain, shortness of breath fever shakes or chills    Patient denies any COVID 19 symptoms including cough or loss of taste or smell    COVID vaccine status: Patient is aware of COVID vaccine protocol.    Medical problems:Patient with history of HTN currently on valsartan.     Colonoscopy status: Completes Cologaurd. Last in 2022 with normal results.     Mammogram status:Last 9/11/2023 with no evidence of malignancy. Denies changes to her breasts.     The following portions of the patient's history were reviewed and updated as appropriate: allergies, current medications, past family history, past medical history, past social  history, past surgical history and problem list.    Review of Systems   Constitutional: Negative.  Negative for appetite change, diaphoresis, fatigue, fever and unexpected weight change.   HENT: Negative.     Eyes: Negative.    Respiratory: Negative.     Cardiovascular: Negative.    Gastrointestinal: Negative.  Negative for abdominal pain, blood in stool, constipation, diarrhea, nausea and vomiting.   Endocrine: Negative.  Negative for cold intolerance and heat intolerance.   Genitourinary: Negative.  Negative for dysuria, frequency, hematuria, urgency, vaginal bleeding, vaginal discharge and vaginal pain.   Musculoskeletal: Negative.    Skin: Negative.    Allergic/Immunologic: Negative.    Neurological: Negative.    Hematological: Negative.  Negative for adenopathy.   Psychiatric/Behavioral: Negative.           Objective:      /86   Wt 74.4 kg (164 lb)   BMI 29.52 kg/m²        Physical Exam  Exam conducted with a chaperone present.   Constitutional:       General: She is not in acute distress.     Appearance: Normal appearance. She is well-developed. She is not diaphoretic.   HENT:      Head: Normocephalic and atraumatic.   Eyes:      Pupils: Pupils are equal, round, and reactive to light.   Cardiovascular:      Rate and Rhythm: Normal rate and regular rhythm.      Heart sounds: Normal heart sounds. No murmur heard.     No friction rub. No gallop.   Pulmonary:      Effort: Pulmonary effort is normal.      Breath sounds: Normal breath sounds.   Chest:   Breasts:     Breasts are symmetrical.      Right: No inverted nipple, mass, nipple discharge, skin change or tenderness.      Left: No inverted nipple, mass, nipple discharge, skin change or tenderness.   Abdominal:      General: Bowel sounds are normal.      Palpations: Abdomen is soft.   Genitourinary:     General: Normal vulva.      Exam position: Supine.      Labia:         Right: No rash or lesion.         Left: No rash or lesion.       Vagina: Normal.  No vaginal discharge, erythema, tenderness or bleeding.      Cervix: No discharge or friability.      Uterus: Not enlarged and not tender.       Adnexa:         Right: No mass, tenderness or fullness.          Left: No mass, tenderness or fullness.        Rectum: Normal. Guaiac result negative.      Comments: Atrophy of the vaginal wall with good support. Pap obtained during visit.   Musculoskeletal:         General: Normal range of motion.      Cervical back: Normal range of motion and neck supple.   Lymphadenopathy:      Cervical: No cervical adenopathy.      Upper Body:      Right upper body: No supraclavicular adenopathy.      Left upper body: No supraclavicular adenopathy.   Skin:     General: Skin is warm and dry.      Findings: No rash.   Neurological:      General: No focal deficit present.      Mental Status: She is alert and oriented to person, place, and time.   Psychiatric:         Mood and Affect: Mood normal.         Speech: Speech normal.         Behavior: Behavior normal.         Thought Content: Thought content normal.         Judgment: Judgment normal.

## 2024-07-22 NOTE — PATIENT INSTRUCTIONS
Normal gynecological physical examination.  Self-breast examination stressed.  Mammogram ordered.  Discussed regular exercise, healthy diet, importance of vitamin D and calcium supplements.  Discussed importance of sun block use during periods of prolonged sun exposure.  Pap obtained during visit. Will contact patient with the results.   Patient will be seen in 1 year for routine gynecologic and medical examination.  Patient will call office for any problems, concerns, or issues which may arise during the interim.

## 2024-07-25 LAB
CLINICAL INFO: NORMAL
CYTO CVX: NORMAL
DATE PREVIOUS BX: NORMAL
LMP START DATE: NORMAL
SL AMB PREV. PAP:: NORMAL
SPECIMEN SOURCE CVX/VAG CYTO: NORMAL
STAT OF ADQ CVX/VAG CYTO-IMP: NORMAL

## 2025-04-02 ENCOUNTER — PREP FOR PROCEDURE (OUTPATIENT)
Dept: SURGERY | Facility: CLINIC | Age: 65
End: 2025-04-02

## 2025-04-02 ENCOUNTER — CONSULT (OUTPATIENT)
Dept: SURGERY | Facility: CLINIC | Age: 65
End: 2025-04-02
Payer: COMMERCIAL

## 2025-04-02 VITALS
HEART RATE: 107 BPM | HEIGHT: 62 IN | WEIGHT: 157 LBS | OXYGEN SATURATION: 97 % | DIASTOLIC BLOOD PRESSURE: 80 MMHG | SYSTOLIC BLOOD PRESSURE: 126 MMHG | BODY MASS INDEX: 28.89 KG/M2

## 2025-04-02 DIAGNOSIS — K43.2 INCISIONAL HERNIA: Primary | ICD-10-CM

## 2025-04-02 DIAGNOSIS — K43.2 INCISIONAL HERNIA, WITHOUT OBSTRUCTION OR GANGRENE: Primary | ICD-10-CM

## 2025-04-02 PROCEDURE — 99204 OFFICE O/P NEW MOD 45 MIN: CPT | Performed by: SURGERY

## 2025-04-02 RX ORDER — SEMAGLUTIDE 0.68 MG/ML
INJECTION, SOLUTION SUBCUTANEOUS WEEKLY
COMMUNITY
Start: 2024-11-06

## 2025-04-02 NOTE — H&P (VIEW-ONLY)
"Name: Eliana Valladares      : 1960      MRN: [id number]  Encounter Provider: Varun Jorge DO  Encounter Date: 2025   Encounter department: [facility] GENERAL SURGERY [facility]  :  Assessment & Plan  Incisional hernia, without obstruction or gangrene  Discussed risks and benefits of an open incisional hernia repair including potential of bowel injury or recurrence or pain issues and she agrees to proceed.  Will need CBC, BMP, EKG preoperatively.    She will hold her Ozempic for 1 week before surgery.  Jardiance 4 days prior to surgery, metformin the day of surgery.  She is not on aspirin.           History of Present Illness   HPI  Eliana Valladares is a 64 y.o. female who presents for ventral hernia consult.  States she has had a bulge and intermittent \"soreness\" on her abdominal incision for 2 to 3 years.  Does not limit her activities.  History of ventral hernia repair 12 to 13 years ago at [facility].  She believes it was done laparoscopically but is not quite sure.  Unsure whether mesh was utilized or not  History obtained from: patient    Review of Systems   Gastrointestinal:  Positive for abdominal pain.   All other systems reviewed and are negative.    Medical History Reviewed by provider this encounter:     .     Objective   /80   Pulse (!) 107   Ht 5' 2\" (1.575 m)   Wt 71.2 kg (157 lb)   SpO2 97%   BMI 28.72 kg/m²      Physical Exam  Constitutional:       General: She is not in acute distress.  HENT:      Mouth/Throat:      Mouth: Mucous membranes are moist.   Eyes:      Extraocular Movements: Extraocular movements intact.   Cardiovascular:      Rate and Rhythm: Normal rate.   Pulmonary:      Effort: Pulmonary effort is normal.   Abdominal:      General: Abdomen is flat.      Palpations: Abdomen is soft.      Hernia: A hernia (2 cm incisional hernia noted in the mid epigastrium.  More noticeable with standing and a Valsalva maneuver.) is present.   Musculoskeletal:      " Cervical back: Normal range of motion.   Skin:     General: Skin is warm and dry.   Neurological:      Mental Status: She is alert.

## 2025-04-02 NOTE — PROGRESS NOTES
"Name: Eliana Valladares      : 1960      MRN: [id number]  Encounter Provider: Varun Jorge DO  Encounter Date: 2025   Encounter department: [facility] GENERAL SURGERY [facility]  :  Assessment & Plan  Incisional hernia, without obstruction or gangrene  Discussed risks and benefits of an open incisional hernia repair including potential of bowel injury or recurrence or pain issues and she agrees to proceed.  Will need CBC, BMP, EKG preoperatively.    She will hold her Ozempic for 1 week before surgery.  Jardiance 4 days prior to surgery, metformin the day of surgery.  She is not on aspirin.           History of Present Illness   HPI  Eliana Valladares is a 64 y.o. female who presents for ventral hernia consult.  States she has had a bulge and intermittent \"soreness\" on her abdominal incision for 2 to 3 years.  Does not limit her activities.  History of ventral hernia repair 12 to 13 years ago at [facility].  She believes it was done laparoscopically but is not quite sure.  Unsure whether mesh was utilized or not  History obtained from: patient    Review of Systems   Gastrointestinal:  Positive for abdominal pain.   All other systems reviewed and are negative.    Medical History Reviewed by provider this encounter:     .     Objective   /80   Pulse (!) 107   Ht 5' 2\" (1.575 m)   Wt 71.2 kg (157 lb)   SpO2 97%   BMI 28.72 kg/m²      Physical Exam  Constitutional:       General: She is not in acute distress.  HENT:      Mouth/Throat:      Mouth: Mucous membranes are moist.   Eyes:      Extraocular Movements: Extraocular movements intact.   Cardiovascular:      Rate and Rhythm: Normal rate.   Pulmonary:      Effort: Pulmonary effort is normal.   Abdominal:      General: Abdomen is flat.      Palpations: Abdomen is soft.      Hernia: A hernia (2 cm incisional hernia noted in the mid epigastrium.  More noticeable with standing and a Valsalva maneuver.) is present.   Musculoskeletal:      " Cervical back: Normal range of motion.   Skin:     General: Skin is warm and dry.   Neurological:      Mental Status: She is alert.

## 2025-04-09 ENCOUNTER — TELEPHONE (OUTPATIENT)
Age: 65
End: 2025-04-09

## 2025-04-09 ENCOUNTER — TELEPHONE (OUTPATIENT)
Dept: SURGERY | Facility: CLINIC | Age: 65
End: 2025-04-09

## 2025-04-09 NOTE — TELEPHONE ENCOUNTER
Patient called with questions about her surgery. Send secure chat to Dr. Jorge to contact patient.

## 2025-04-10 ENCOUNTER — APPOINTMENT (OUTPATIENT)
Dept: LAB | Facility: CLINIC | Age: 65
End: 2025-04-10
Payer: COMMERCIAL

## 2025-04-10 ENCOUNTER — OFFICE VISIT (OUTPATIENT)
Dept: LAB | Facility: CLINIC | Age: 65
End: 2025-04-10
Payer: COMMERCIAL

## 2025-04-10 DIAGNOSIS — K43.2 INCISIONAL HERNIA: ICD-10-CM

## 2025-04-10 LAB
ANION GAP SERPL CALCULATED.3IONS-SCNC: 7 MMOL/L (ref 4–13)
ATRIAL RATE: 89 BPM
BUN SERPL-MCNC: 19 MG/DL (ref 5–25)
CALCIUM SERPL-MCNC: 10.1 MG/DL (ref 8.4–10.2)
CHLORIDE SERPL-SCNC: 104 MMOL/L (ref 96–108)
CO2 SERPL-SCNC: 29 MMOL/L (ref 21–32)
CREAT SERPL-MCNC: 0.94 MG/DL (ref 0.6–1.3)
ERYTHROCYTE [DISTWIDTH] IN BLOOD BY AUTOMATED COUNT: 12.6 % (ref 11.6–15.1)
GFR SERPL CREATININE-BSD FRML MDRD: 64 ML/MIN/1.73SQ M
GLUCOSE SERPL-MCNC: 103 MG/DL (ref 65–140)
HCT VFR BLD AUTO: 49.1 % (ref 34.8–46.1)
HGB BLD-MCNC: 16.1 G/DL (ref 11.5–15.4)
MCH RBC QN AUTO: 30.1 PG (ref 26.8–34.3)
MCHC RBC AUTO-ENTMCNC: 32.8 G/DL (ref 31.4–37.4)
MCV RBC AUTO: 92 FL (ref 82–98)
P AXIS: 13 DEGREES
PLATELET # BLD AUTO: 509 THOUSANDS/UL (ref 149–390)
PMV BLD AUTO: 9.8 FL (ref 8.9–12.7)
POTASSIUM SERPL-SCNC: 4.6 MMOL/L (ref 3.5–5.3)
PR INTERVAL: 172 MS
QRS AXIS: -5 DEGREES
QRSD INTERVAL: 80 MS
QT INTERVAL: 356 MS
QTC INTERVAL: 434 MS
RBC # BLD AUTO: 5.35 MILLION/UL (ref 3.81–5.12)
SODIUM SERPL-SCNC: 140 MMOL/L (ref 135–147)
T WAVE AXIS: 40 DEGREES
VENTRICULAR RATE: 89 BPM
WBC # BLD AUTO: 8.13 THOUSAND/UL (ref 4.31–10.16)

## 2025-04-10 PROCEDURE — 93010 ELECTROCARDIOGRAM REPORT: CPT | Performed by: INTERNAL MEDICINE

## 2025-04-10 PROCEDURE — 93005 ELECTROCARDIOGRAM TRACING: CPT

## 2025-04-10 PROCEDURE — 85027 COMPLETE CBC AUTOMATED: CPT

## 2025-04-10 PROCEDURE — 36415 COLL VENOUS BLD VENIPUNCTURE: CPT

## 2025-04-10 PROCEDURE — 80048 BASIC METABOLIC PNL TOTAL CA: CPT

## 2025-04-15 ENCOUNTER — ANESTHESIA EVENT (OUTPATIENT)
Dept: PERIOP | Facility: HOSPITAL | Age: 65
End: 2025-04-15
Payer: COMMERCIAL

## 2025-04-15 RX ORDER — ALBUTEROL SULFATE 90 UG/1
2 INHALANT RESPIRATORY (INHALATION) EVERY 6 HOURS PRN
COMMUNITY
Start: 2025-04-03

## 2025-04-15 NOTE — PRE-PROCEDURE INSTRUCTIONS
Pre-Surgery Instructions:   Medication Instructions    albuterol (PROVENTIL HFA,VENTOLIN HFA) 90 mcg/act inhaler Uses PRN- OK to take day of surgery    ALPRAZolam (XANAX) 0.25 mg tablet Uses PRN- OK to take day of surgery    Biotin 06119 MCG TABS Stop taking 7 days prior to surgery.    Calcium-Magnesium-Vitamin D (CALCIUM 1200+D3 PO) Stop taking 7 days prior to surgery.    CINNAMON PO Stop taking 7 days prior to surgery.    Jardiance 25 MG TABS Stop taking 4 days prior to surgery.    lovastatin (MEVACOR) 40 MG tablet Take day of surgery.    metFORMIN (GLUCOPHAGE) 500 mg tablet Hold day of surgery.    Multiple Vitamin (MULTI-VITAMIN DAILY PO) Stop taking 7 days prior to surgery.    Ozempic, 0.25 or 0.5 MG/DOSE, 2 MG/3ML injection pen Stop taking 7 days prior to surgery.Last dose 4/12/25    valsartan (DIOVAN) 160 mg tablet Hold day of surgery.   Medication instructions for day of surgery reviewed. Please take all instructed medications with only a sip of water.       You will receive a call one business day prior to surgery with an arrival time and hospital directions. If your surgery is scheduled on a Monday, the hospital will be calling you on the Friday prior to your surgery. If you have not heard from anyone by 8pm, please call the hospital supervisor through the hospital  at [phone].  or [place] [phone]).    Do not eat or drink anything after midnight the night before your surgery, including candy, mints, lifesavers, or chewing gum. Do not drink alcohol 24hrs before your surgery. Try not to smoke at least 24hrs before your surgery.       Follow the pre surgery showering instructions as listed in the “My Surgical Experience Booklet” or otherwise provided by your surgeon's office. Do not use a blade to shave the surgical area 1 week before surgery. It is okay to use a clean electric clippers up to 24 hours before surgery. Do not apply any lotions, creams, including makeup, cologne, deodorant, or  perfumes after showering on the day of your surgery. Do not use dry shampoo, hair spray, hair gel, or any type of hair products.     No contact lenses, eye make-up, or artificial eyelashes. Remove nail polish, including gel polish, and any artificial, gel, or acrylic nails if possible. Remove all jewelry including rings and body piercing jewelry.     Wear causal clothing that is easy to take on and off. Consider your type of surgery.    Keep any valuables, jewelry, piercings at home. Please bring any specially ordered equipment (sling, braces) if indicated.    Arrange for a responsible person to drive you to and from the hospital on the day of your surgery. Please confirm the visitor policy for the day of your procedure when you receive your phone call with an arrival time.     Call the surgeon's office with any new illnesses, exposures, or additional questions prior to surgery.    Please reference your “My Surgical Experience Booklet” for additional information to prepare for your upcoming surgery.

## 2025-04-23 RX ORDER — SODIUM CHLORIDE, SODIUM LACTATE, POTASSIUM CHLORIDE, CALCIUM CHLORIDE 600; 310; 30; 20 MG/100ML; MG/100ML; MG/100ML; MG/100ML
125 INJECTION, SOLUTION INTRAVENOUS CONTINUOUS
Status: CANCELLED | OUTPATIENT
Start: 2025-04-23

## 2025-04-24 ENCOUNTER — ANESTHESIA (OUTPATIENT)
Dept: PERIOP | Facility: HOSPITAL | Age: 65
End: 2025-04-24
Payer: COMMERCIAL

## 2025-04-24 ENCOUNTER — HOSPITAL ENCOUNTER (OUTPATIENT)
Facility: HOSPITAL | Age: 65
Setting detail: OUTPATIENT SURGERY
Discharge: HOME | End: 2025-04-24
Attending: SURGERY | Admitting: SURGERY
Payer: COMMERCIAL

## 2025-04-24 VITALS
BODY MASS INDEX: 28.89 KG/M2 | OXYGEN SATURATION: 95 % | SYSTOLIC BLOOD PRESSURE: 127 MMHG | RESPIRATION RATE: 18 BRPM | HEIGHT: 62 IN | TEMPERATURE: 97.2 F | WEIGHT: 157 LBS | DIASTOLIC BLOOD PRESSURE: 65 MMHG | HEART RATE: 79 BPM

## 2025-04-24 DIAGNOSIS — K43.2 INCISIONAL HERNIA, WITHOUT OBSTRUCTION OR GANGRENE: Primary | ICD-10-CM

## 2025-04-24 PROBLEM — R11.2 PONV (POSTOPERATIVE NAUSEA AND VOMITING): Status: ACTIVE | Noted: 2025-04-24

## 2025-04-24 PROBLEM — Z98.890 PONV (POSTOPERATIVE NAUSEA AND VOMITING): Status: ACTIVE | Noted: 2025-04-24

## 2025-04-24 LAB — GLUCOSE SERPL-MCNC: 143 MG/DL (ref 65–140)

## 2025-04-24 PROCEDURE — 82948 REAGENT STRIP/BLOOD GLUCOSE: CPT

## 2025-04-24 PROCEDURE — 49591 RPR AA HRN 1ST < 3 CM RDC: CPT | Performed by: SURGERY

## 2025-04-24 PROCEDURE — C1781 MESH (IMPLANTABLE): HCPCS | Performed by: SURGERY

## 2025-04-24 DEVICE — VENTRALEX ST HERNIA PATCH, 4.3 CM (1.7"), CIRCLE
Type: IMPLANTABLE DEVICE | Site: ABDOMEN | Status: FUNCTIONAL
Brand: VENTRALEX

## 2025-04-24 RX ORDER — FENTANYL CITRATE 50 UG/ML
INJECTION, SOLUTION INTRAMUSCULAR; INTRAVENOUS AS NEEDED
Status: DISCONTINUED | OUTPATIENT
Start: 2025-04-24 | End: 2025-04-24

## 2025-04-24 RX ORDER — HYDROMORPHONE HCL IN WATER/PF 6 MG/30 ML
0.2 PATIENT CONTROLLED ANALGESIA SYRINGE INTRAVENOUS
Status: DISCONTINUED | OUTPATIENT
Start: 2025-04-24 | End: 2025-04-24 | Stop reason: HOSPADM

## 2025-04-24 RX ORDER — HYDROMORPHONE HCL/PF 1 MG/ML
SYRINGE (ML) INJECTION AS NEEDED
Status: DISCONTINUED | OUTPATIENT
Start: 2025-04-24 | End: 2025-04-24

## 2025-04-24 RX ORDER — ONDANSETRON 2 MG/ML
4 INJECTION INTRAMUSCULAR; INTRAVENOUS ONCE AS NEEDED
Status: DISCONTINUED | OUTPATIENT
Start: 2025-04-24 | End: 2025-04-24 | Stop reason: HOSPADM

## 2025-04-24 RX ORDER — DEXAMETHASONE SODIUM PHOSPHATE 10 MG/ML
INJECTION, SOLUTION INTRAMUSCULAR; INTRAVENOUS AS NEEDED
Status: DISCONTINUED | OUTPATIENT
Start: 2025-04-24 | End: 2025-04-24

## 2025-04-24 RX ORDER — HYDROMORPHONE HCL/PF 1 MG/ML
0.5 SYRINGE (ML) INJECTION
Status: DISCONTINUED | OUTPATIENT
Start: 2025-04-24 | End: 2025-04-24 | Stop reason: HOSPADM

## 2025-04-24 RX ORDER — ONDANSETRON 2 MG/ML
4 INJECTION INTRAMUSCULAR; INTRAVENOUS EVERY 4 HOURS PRN
Status: DISCONTINUED | OUTPATIENT
Start: 2025-04-24 | End: 2025-04-24 | Stop reason: HOSPADM

## 2025-04-24 RX ORDER — PROPOFOL 10 MG/ML
INJECTION, EMULSION INTRAVENOUS CONTINUOUS PRN
Status: DISCONTINUED | OUTPATIENT
Start: 2025-04-24 | End: 2025-04-24

## 2025-04-24 RX ORDER — FAMOTIDINE 10 MG/ML
20 INJECTION, SOLUTION INTRAVENOUS ONCE
Status: COMPLETED | OUTPATIENT
Start: 2025-04-24 | End: 2025-04-24

## 2025-04-24 RX ORDER — OXYCODONE HYDROCHLORIDE 5 MG/1
5 TABLET ORAL EVERY 4 HOURS PRN
Qty: 10 TABLET | Refills: 0 | Status: SHIPPED | OUTPATIENT
Start: 2025-04-24 | End: 2025-05-04

## 2025-04-24 RX ORDER — KETOROLAC TROMETHAMINE 30 MG/ML
INJECTION, SOLUTION INTRAMUSCULAR; INTRAVENOUS AS NEEDED
Status: DISCONTINUED | OUTPATIENT
Start: 2025-04-24 | End: 2025-04-24

## 2025-04-24 RX ORDER — MIDAZOLAM HYDROCHLORIDE 2 MG/2ML
2 INJECTION, SOLUTION INTRAMUSCULAR; INTRAVENOUS ONCE
Status: COMPLETED | OUTPATIENT
Start: 2025-04-24 | End: 2025-04-24

## 2025-04-24 RX ORDER — SODIUM CHLORIDE, SODIUM LACTATE, POTASSIUM CHLORIDE, CALCIUM CHLORIDE 600; 310; 30; 20 MG/100ML; MG/100ML; MG/100ML; MG/100ML
125 INJECTION, SOLUTION INTRAVENOUS CONTINUOUS
Status: DISCONTINUED | OUTPATIENT
Start: 2025-04-24 | End: 2025-04-24 | Stop reason: HOSPADM

## 2025-04-24 RX ORDER — LORAZEPAM 2 MG/ML
1 INJECTION INTRAMUSCULAR
Status: DISCONTINUED | OUTPATIENT
Start: 2025-04-24 | End: 2025-04-24 | Stop reason: HOSPADM

## 2025-04-24 RX ORDER — MAGNESIUM HYDROXIDE 1200 MG/15ML
LIQUID ORAL AS NEEDED
Status: DISCONTINUED | OUTPATIENT
Start: 2025-04-24 | End: 2025-04-24 | Stop reason: HOSPADM

## 2025-04-24 RX ORDER — FENTANYL CITRATE/PF 50 MCG/ML
25 SYRINGE (ML) INJECTION
Status: DISCONTINUED | OUTPATIENT
Start: 2025-04-24 | End: 2025-04-24 | Stop reason: HOSPADM

## 2025-04-24 RX ORDER — PROPOFOL 10 MG/ML
INJECTION, EMULSION INTRAVENOUS AS NEEDED
Status: DISCONTINUED | OUTPATIENT
Start: 2025-04-24 | End: 2025-04-24

## 2025-04-24 RX ORDER — HYDRALAZINE HYDROCHLORIDE 20 MG/ML
5 INJECTION INTRAMUSCULAR; INTRAVENOUS
Status: DISCONTINUED | OUTPATIENT
Start: 2025-04-24 | End: 2025-04-24 | Stop reason: HOSPADM

## 2025-04-24 RX ORDER — LABETALOL HYDROCHLORIDE 5 MG/ML
10 INJECTION, SOLUTION INTRAVENOUS
Status: DISCONTINUED | OUTPATIENT
Start: 2025-04-24 | End: 2025-04-24 | Stop reason: HOSPADM

## 2025-04-24 RX ORDER — OXYCODONE HYDROCHLORIDE 5 MG/1
5 TABLET ORAL EVERY 4 HOURS PRN
Refills: 0 | Status: DISCONTINUED | OUTPATIENT
Start: 2025-04-24 | End: 2025-04-24 | Stop reason: HOSPADM

## 2025-04-24 RX ORDER — ALBUTEROL SULFATE 0.83 MG/ML
2.5 SOLUTION RESPIRATORY (INHALATION) ONCE AS NEEDED
Status: DISCONTINUED | OUTPATIENT
Start: 2025-04-24 | End: 2025-04-24 | Stop reason: HOSPADM

## 2025-04-24 RX ORDER — ONDANSETRON 2 MG/ML
INJECTION INTRAMUSCULAR; INTRAVENOUS AS NEEDED
Status: DISCONTINUED | OUTPATIENT
Start: 2025-04-24 | End: 2025-04-24

## 2025-04-24 RX ORDER — PROMETHAZINE HYDROCHLORIDE 25 MG/ML
12.5 INJECTION, SOLUTION INTRAMUSCULAR; INTRAVENOUS ONCE AS NEEDED
Status: DISCONTINUED | OUTPATIENT
Start: 2025-04-24 | End: 2025-04-24 | Stop reason: HOSPADM

## 2025-04-24 RX ORDER — CEFAZOLIN SODIUM 2 G/50ML
2000 SOLUTION INTRAVENOUS ONCE
Status: COMPLETED | OUTPATIENT
Start: 2025-04-24 | End: 2025-04-24

## 2025-04-24 RX ORDER — SCOPOLAMINE 1 MG/3D
1 PATCH, EXTENDED RELEASE TRANSDERMAL ONCE
Status: DISCONTINUED | OUTPATIENT
Start: 2025-04-24 | End: 2025-04-24 | Stop reason: HOSPADM

## 2025-04-24 RX ORDER — METOCLOPRAMIDE HYDROCHLORIDE 5 MG/ML
10 INJECTION INTRAMUSCULAR; INTRAVENOUS ONCE AS NEEDED
Status: DISCONTINUED | OUTPATIENT
Start: 2025-04-24 | End: 2025-04-24 | Stop reason: HOSPADM

## 2025-04-24 RX ORDER — BUPIVACAINE HYDROCHLORIDE AND EPINEPHRINE 2.5; 5 MG/ML; UG/ML
INJECTION, SOLUTION EPIDURAL; INFILTRATION; INTRACAUDAL; PERINEURAL AS NEEDED
Status: DISCONTINUED | OUTPATIENT
Start: 2025-04-24 | End: 2025-04-24 | Stop reason: HOSPADM

## 2025-04-24 RX ORDER — HYDROMORPHONE HCL/PF 1 MG/ML
0.5 SYRINGE (ML) INJECTION
Refills: 0 | Status: DISCONTINUED | OUTPATIENT
Start: 2025-04-24 | End: 2025-04-24 | Stop reason: HOSPADM

## 2025-04-24 RX ORDER — LIDOCAINE HYDROCHLORIDE 10 MG/ML
INJECTION, SOLUTION EPIDURAL; INFILTRATION; INTRACAUDAL; PERINEURAL AS NEEDED
Status: DISCONTINUED | OUTPATIENT
Start: 2025-04-24 | End: 2025-04-24

## 2025-04-24 RX ORDER — ROCURONIUM BROMIDE 10 MG/ML
INJECTION, SOLUTION INTRAVENOUS AS NEEDED
Status: DISCONTINUED | OUTPATIENT
Start: 2025-04-24 | End: 2025-04-24

## 2025-04-24 RX ADMIN — SUGAMMADEX 200 MG: 100 INJECTION, SOLUTION INTRAVENOUS at 10:21

## 2025-04-24 RX ADMIN — SCOPOLAMINE 1 PATCH: 1.5 PATCH, EXTENDED RELEASE TRANSDERMAL at 09:04

## 2025-04-24 RX ADMIN — HYDROMORPHONE HYDROCHLORIDE 0.5 MG: 1 INJECTION, SOLUTION INTRAMUSCULAR; INTRAVENOUS; SUBCUTANEOUS at 10:08

## 2025-04-24 RX ADMIN — MIDAZOLAM HYDROCHLORIDE 2 MG: 1 INJECTION, SOLUTION INTRAMUSCULAR; INTRAVENOUS at 09:05

## 2025-04-24 RX ADMIN — FAMOTIDINE 20 MG: 10 INJECTION INTRAVENOUS at 09:26

## 2025-04-24 RX ADMIN — PROPOFOL 200 MG: 10 INJECTION, EMULSION INTRAVENOUS at 09:46

## 2025-04-24 RX ADMIN — KETOROLAC TROMETHAMINE 30 MG: 30 INJECTION, SOLUTION INTRAMUSCULAR; INTRAVENOUS at 10:13

## 2025-04-24 RX ADMIN — ONDANSETRON 4 MG: 2 INJECTION INTRAMUSCULAR; INTRAVENOUS at 09:46

## 2025-04-24 RX ADMIN — ROCURONIUM 50 MG: 50 INJECTION, SOLUTION INTRAVENOUS at 09:46

## 2025-04-24 RX ADMIN — FENTANYL CITRATE 100 MCG: 50 INJECTION INTRAMUSCULAR; INTRAVENOUS at 09:46

## 2025-04-24 RX ADMIN — SODIUM CHLORIDE, SODIUM LACTATE, POTASSIUM CHLORIDE, AND CALCIUM CHLORIDE: .6; .31; .03; .02 INJECTION, SOLUTION INTRAVENOUS at 09:40

## 2025-04-24 RX ADMIN — DEXAMETHASONE SODIUM PHOSPHATE 10 MG: 10 INJECTION INTRAMUSCULAR; INTRAVENOUS at 09:46

## 2025-04-24 RX ADMIN — PROPOFOL 80 MCG/KG/MIN: 10 INJECTION, EMULSION INTRAVENOUS at 09:49

## 2025-04-24 RX ADMIN — LIDOCAINE HYDROCHLORIDE 100 MG: 10 INJECTION, SOLUTION EPIDURAL; INFILTRATION; INTRACAUDAL at 09:46

## 2025-04-24 RX ADMIN — CEFAZOLIN SODIUM 2000 MG: 2 SOLUTION INTRAVENOUS at 09:50

## 2025-04-24 NOTE — ANESTHESIA POSTPROCEDURE EVALUATION
Post-Op Assessment Note    CV Status:  Stable  Pain Score: 0    Pain management: adequate       Mental Status:  Alert and awake   Hydration Status:  Euvolemic   PONV Controlled:  Controlled   Airway Patency:  Patent  Two or more mitigation strategies used for obstructive sleep apnea   Post Op Vitals Reviewed: Yes    No anethesia notable event occurred.    Staff: CRNA, Anesthesiologist           Last Filed PACU Vitals:  Vitals Value Taken Time   Temp 97    Pulse 113    /79    Resp 18    SpO2 95

## 2025-04-24 NOTE — ANESTHESIA POSTPROCEDURE EVALUATION
Post-Op Assessment Note    Last Filed PACU Vitals:  Vitals Value Taken Time   Temp     Pulse 82 04/24/25 1101   /55 04/24/25 1100   Resp 9 04/24/25 1101   SpO2 93 % 04/24/25 1101   Vitals shown include unfiled device data.    Modified Tracey:     Vitals Value Taken Time   Activity 2 04/24/25 1100   Respiration 2 04/24/25 1100   Circulation 2 04/24/25 1100   Consciousness 1 04/24/25 1100   Oxygen Saturation 2 04/24/25 1100     Modified Tracey Score: 9

## 2025-04-24 NOTE — INTERVAL H&P NOTE
H&P reviewed. After examining the patient I find no changes in the patients condition since the H&P had been written.    Vitals:    04/24/25 0841   BP: 133/80   Pulse: 104   Resp: 18   Temp: 97.7 °F (36.5 °C)   SpO2: 96%

## 2025-04-24 NOTE — ANESTHESIA PREPROCEDURE EVALUATION
Procedure:  REPAIR HERNIA INCISIONAL (Abdomen)    Relevant Problems   ANESTHESIA   (+) PONV (postoperative nausea and vomiting)      CARDIO   (+) Hyperlipidemia   (+) Hypertension      GI/HEPATIC   (+) GERD (gastroesophageal reflux disease)      NEURO/PSYCH   (+) Anxiety      Endocrine   (+) Diabetes mellitus (HCC)      Blood   (+) Erythrocytosis   (+) Thrombocytosis        Physical Exam    Airway    Mallampati score: II  TM Distance: >3 FB  Neck ROM: full     Dental        Cardiovascular  Rhythm: regular, Rate: normal, Cardiovascular exam normal    Pulmonary  Pulmonary exam normal Breath sounds clear to auscultation    Other Findings  post-pubertal.      Anesthesia Plan  ASA Score- 3     Anesthesia Type- general with ASA Monitors.         Additional Monitors:     Airway Plan: ETT.           Plan Factors-Exercise tolerance (METS): >4 METS.    Chart reviewed.   Existing labs reviewed. Patient summary reviewed.    Patient is not a current smoker.              Induction- intravenous.    Postoperative Plan- Plan for postoperative opioid use. Planned trial extubation        Informed Consent- Anesthetic plan and risks discussed with patient.  I personally reviewed this patient with the CRNA. Discussed and agreed on the Anesthesia Plan with the CRNA..      NPO Status:  No vitals data found for the desired time range.

## 2025-04-24 NOTE — DISCHARGE INSTR - AVS FIRST PAGE
Please call the office when you leave to schedule an appointment to be seen in 2-3 weeks    Activity:    Do not lift more than 25 pounds for 4 weeks post-operatively                 Walking is encouraged  Normal daily activities including climbing steps are okay  Do not engage in strenuous activity or contact sports for 4-6 weeks post-operatively    Return to work:   Return to work to be discussed at first post-operative visit    Diet:   You may return to your normal heart healthy diet    Wound Care:  Your wound is closed with skin glue  It is okay to shower. Wash incision gently with soap and water and pat dry. Do not soak incisions in bath water or swim for two weeks. Do not apply any creams or ointments.  Apply ice as needed to the incision.  Recommend you keep ice continuously in the incision for the next 3 to 4 days  Pain Medication:   Please take as directed  No driving while taking narcotic pain medications    Other:  If you have questions after discharge please call the office.    If you have increased pain, fever >101.5, increased drainage, redness or a bad smell at your surgery site, please call us immediately or come directly to the Emergency Room

## 2025-04-24 NOTE — OP NOTE
OPERATIVE REPORT  PATIENT NAME: Eliana Valladares    :  1960  MRN: [id number]  Pt Location: AN OR ROOM 01    SURGERY DATE: 2025    Surgeons and Role:     * Varun Jorge,  - Primary     * Elvin Kwon MD - Assisting    Preop Diagnosis:  Incisional hernia [K43.2]    Post-Op Diagnosis Codes:     * Incisional hernia [K43.2]    Procedure(s):  REPAIR HERNIA INCISIONAL WITH MESH, 1.7 inch Ventralex    Specimen(s):  * No specimens in log *    Estimated Blood Loss:   Minimal    Drains:  * No LDAs found *    Anesthesia Type:   General    Operative Indications:  Incisional hernia [K43.2]      Operative Findings:  Height 62 inches weight 71 kg 857 pounds.  BMI 29.  ASA 3.  Wound class II  Prior to opening the fascia, or reducing the contents of the hernia, the hernia defect was measured. The defect measured 2 cm by 2 cm. This was repaired as described in the body of the report below.      Complications:   None    Procedure and Technique:  Patient was brought in the operative suite identified by visualization, conversation, by armband.  Sequential compression pumps were placed.  She was given Ancef perioperatively.  Once under anesthesia abdomen was prepped and draped in a sterile fashion.  Marked over the small palpable incisional hernia of the epigastrium.  Timeout was performed and is assured that the prep was dried.  Local was instilled over this sandhya.  Skin incision was made midline subcutaneous tissue was divided until the hernia was noted.  Hernia sac was somewhat lengthy and actually emanated from the superior aspect of her incision from her previous trocar site.  Hernia was freed up from the surrounding tissues.  Much of preperitoneal fat was able to be reduced.  Preperitoneal space was opened up with a 4 x 4 sponge.  1.7 inch Ventralex patch was chosen and placed under the fascial defect.  Tails were anchored 1 cm back from the edge with #1 Vicryl suture.  The tails were trimmed irrigations  carried out.  Fascia was closed on top of the mesh using #1 Vicryl in a figure-of-eight fashion x 2.  Local was instilled.  2-0 Vicryl was used to close subcutaneous tissues.  4-0 Monocryl was used to close skin in a subcuticular fashion.  Wound was washed and dried.  Sterile skin glue was applied.  She was awakened in the operating room returned to the recovery area in stable condition having tolerated the procedure well.   I was present for the entire procedure.    Patient Disposition:  PACU              SIGNATURE: Varun Jorge DO  DATE: April 24, 2025  TIME: 10:14 AM

## 2025-04-25 ENCOUNTER — TELEPHONE (OUTPATIENT)
Age: 65
End: 2025-04-25

## 2025-04-25 NOTE — TELEPHONE ENCOUNTER
[facility] calling to confirm dates for patient.    Confirmed surgery date of 4/24 and post op date of 5/8.    No further questions.

## 2025-05-08 ENCOUNTER — TELEPHONE (OUTPATIENT)
Age: 65
End: 2025-05-08

## 2025-05-08 ENCOUNTER — OFFICE VISIT (OUTPATIENT)
Dept: SURGERY | Facility: CLINIC | Age: 65
End: 2025-05-08
Payer: COMMERCIAL

## 2025-05-08 DIAGNOSIS — D17.9 LIPOMA: Primary | ICD-10-CM

## 2025-05-08 DIAGNOSIS — K43.2 INCISIONAL HERNIA, WITHOUT OBSTRUCTION OR GANGRENE: ICD-10-CM

## 2025-05-08 PROCEDURE — 99213 OFFICE O/P EST LOW 20 MIN: CPT | Performed by: SURGERY

## 2025-05-08 NOTE — LETTER
May 8, 2025     Yakov Neri MD  [address] [facility]  [address] [place] [id number]    Patient: Eliana Valladares   YOB: 1960   Date of Visit: 2025       Dear Dr. Yakov Neri MD:    Thank you for referring Eliana Valladares to me for evaluation. Below are my notes for this consultation.    If you have questions, please do not hesitate to call me. I look forward to following your patient along with you.         Sincerely,        Varun Jorge DO        CC: No Recipients    Varun Jorge DO  2025 10:59 AM  Sign when Signing Visit  Name: Eliana Valladares      : 1960      MRN: [id number]  Encounter Provider: Varun Jorge DO  Encounter Date: 2025   Encounter department: [facility] GENERAL SURGERY [facility]  :  Assessment & Plan  Lipoma    Orders:  •  VAS Lower extremity venous insufficiency duplex, Single leg w/ measurements; Future  Pointed out lipomas of the upper extremity as well as the left upper inner thigh.  Will obtain duplex above to assure there is no varicosities associated with the left inner thigh lipoma.  I have asked her to return to the office after the study where we could plan excision of multiple lipomas  Incisional hernia, without obstruction or gangrene  Status post incisional hernia repair with mesh.  Overall doing well.  May resume unrestricted activity as of May 24           History of Present Illness  HPI  Eliana Valladares is a 64 y.o. female who presents post operatively.  Incisional hernia repair with mesh 2025.  No particular issues.  Points out several lipomas which she would like to eventually have removed.  History obtained from: patient    Review of Systems   All other systems reviewed and are negative.    Medical History Reviewed by provider this encounter:     .     Objective  There were no vitals taken for this visit.     Physical Exam  Constitutional:       General: She is not in acute distress.  Abdominal:      Palpations: Abdomen is  soft.      Comments: Well-healed supraumbilical scar.  Healing ridge as expected.  No signs of hernia recurrence.   Skin:     Comments: Multiple lipomas noted of the left upper extremity.  She pointed out what appears to be a sizable lipoma (10 x 10 cm) of the left upper inner thigh.  There are varicose veins noted in this region as well.  It is tender for her.  Will obtain a duplex   Neurological:      Mental Status: She is alert.

## 2025-05-08 NOTE — ASSESSMENT & PLAN NOTE
Orders:    VAS Lower extremity venous insufficiency duplex, Single leg w/ measurements; Future  Pointed out lipomas of the upper extremity as well as the left upper inner thigh.  Will obtain duplex above to assure there is no varicosities associated with the left inner thigh lipoma.  I have asked her to return to the office after the study where we could plan excision of multiple lipomas

## 2025-05-08 NOTE — PROGRESS NOTES
Name: Eliana Valladares      : 1960      MRN: [id number]  Encounter Provider: Varun Jorge DO  Encounter Date: 2025   Encounter department: [facility] GENERAL SURGERY [facility]  :  Assessment & Plan  Lipoma    Orders:    VAS Lower extremity venous insufficiency duplex, Single leg w/ measurements; Future  Pointed out lipomas of the upper extremity as well as the left upper inner thigh.  Will obtain duplex above to assure there is no varicosities associated with the left inner thigh lipoma.  I have asked her to return to the office after the study where we could plan excision of multiple lipomas  Incisional hernia, without obstruction or gangrene  Status post incisional hernia repair with mesh.  Overall doing well.  May resume unrestricted activity as of May 24           History of Present Illness   HPI  Eliana Valladares is a 64 y.o. female who presents post operatively.  Incisional hernia repair with mesh 2025.  No particular issues.  Points out several lipomas which she would like to eventually have removed.  History obtained from: patient    Review of Systems   All other systems reviewed and are negative.    Medical History Reviewed by provider this encounter:     .     Objective   There were no vitals taken for this visit.     Physical Exam  Constitutional:       General: She is not in acute distress.  Abdominal:      Palpations: Abdomen is soft.      Comments: Well-healed supraumbilical scar.  Healing ridge as expected.  No signs of hernia recurrence.   Skin:     Comments: Multiple lipomas noted of the left upper extremity.  She pointed out what appears to be a sizable lipoma (10 x 10 cm) of the left upper inner thigh.  There are varicose veins noted in this region as well.  It is tender for her.  Will obtain a duplex   Neurological:      Mental Status: She is alert.

## 2025-05-08 NOTE — ASSESSMENT & PLAN NOTE
Status post incisional hernia repair with mesh.  Overall doing well.  May resume unrestricted activity as of May 24

## 2025-05-14 ENCOUNTER — TELEPHONE (OUTPATIENT)
Age: 65
End: 2025-05-14

## 2025-05-15 ENCOUNTER — HOSPITAL ENCOUNTER (OUTPATIENT)
Dept: NON INVASIVE DIAGNOSTICS | Facility: CLINIC | Age: 65
Discharge: HOME | End: 2025-05-15
Payer: COMMERCIAL

## 2025-05-15 ENCOUNTER — RESULTS FOLLOW-UP (OUTPATIENT)
Dept: SURGERY | Facility: CLINIC | Age: 65
End: 2025-05-15

## 2025-05-15 DIAGNOSIS — D17.9 LIPOMA: ICD-10-CM

## 2025-05-15 DIAGNOSIS — I83.93 ASYMPTOMATIC SUPERFICIAL VARICOSITIES OF LOWER EXTREMITY, BILATERAL: Primary | ICD-10-CM

## 2025-05-15 PROCEDURE — 93971 EXTREMITY STUDY: CPT

## 2025-05-15 PROCEDURE — 93971 EXTREMITY STUDY: CPT | Performed by: SURGERY

## 2025-05-16 ENCOUNTER — TELEPHONE (OUTPATIENT)
Dept: SURGERY | Facility: CLINIC | Age: 65
End: 2025-05-16

## 2025-05-16 NOTE — TELEPHONE ENCOUNTER
Left message for Eliana to call the office to schedule 30 min appt with Dr Jorge for arm lipoma.  Can schedule the appt or transfer the call to the office to schedule.

## 2025-06-04 ENCOUNTER — PREP FOR PROCEDURE (OUTPATIENT)
Dept: SURGERY | Facility: CLINIC | Age: 65
End: 2025-06-04

## 2025-06-04 ENCOUNTER — OFFICE VISIT (OUTPATIENT)
Dept: SURGERY | Facility: CLINIC | Age: 65
End: 2025-06-04
Payer: COMMERCIAL

## 2025-06-04 VITALS
HEART RATE: 110 BPM | BODY MASS INDEX: 28.16 KG/M2 | WEIGHT: 153 LBS | HEIGHT: 62 IN | OXYGEN SATURATION: 97 % | DIASTOLIC BLOOD PRESSURE: 86 MMHG | SYSTOLIC BLOOD PRESSURE: 134 MMHG

## 2025-06-04 DIAGNOSIS — D17.9 LIPOMA: Primary | ICD-10-CM

## 2025-06-04 DIAGNOSIS — D17.20 LIPOMA OF UPPER EXTREMITY, UNSPECIFIED LATERALITY: Primary | ICD-10-CM

## 2025-06-04 PROCEDURE — 99213 OFFICE O/P EST LOW 20 MIN: CPT | Performed by: SURGERY

## 2025-06-04 NOTE — H&P (VIEW-ONLY)
"Name: Eliana Valladares      : 1960      MRN: [id number]  Encounter Provider: Varun Jorge DO  Encounter Date: 2025   Encounter department: [facility] GENERAL SURGERY [facility]  :  Assessment & Plan  Lipoma of upper extremity, unspecified laterality  Discussed risks and benefits of excision of multiple bilateral upper extremity lipomas and she agrees to proceed.  She just had blood work and EKG for hernia in April.  Will hold her Ozempic for 7 days, and Jardiance for 4 days.           History of Present Illness   HPI  Eliana Valladares is a 65 y.o. female who presents for follow up on multiple lipomas on both of her arms.  She has had these removed in the past.  Has multiple lipomas of both arms she desires removal.  Some mid thigh swelling on the left but this appears to be involved with some saphenous vein swelling.  She is following up with vascular.  History obtained from: patient    Review of Systems   All other systems reviewed and are negative.    Medical History Reviewed by provider this encounter:     .     Objective   /86   Pulse (!) 110   Ht 5' 2\" (1.575 m)   Wt 69.4 kg (153 lb)   SpO2 97%   BMI 27.98 kg/m²      Physical Exam  Constitutional:       General: She is not in acute distress.  HENT:      Head: Atraumatic.      Mouth/Throat:      Mouth: Mucous membranes are moist.     Eyes:      Extraocular Movements: Extraocular movements intact.       Cardiovascular:      Rate and Rhythm: Normal rate.   Pulmonary:      Effort: Pulmonary effort is normal.   Abdominal:      General: Abdomen is flat.      Palpations: Abdomen is soft.     Musculoskeletal:      Cervical back: Normal range of motion.      Comments: Multiple lipomas of bilateral forearms.  Seems to have a nest of the left triceps area.     Skin:     General: Skin is warm and dry.     Neurological:      Mental Status: She is alert.           "

## 2025-06-04 NOTE — ASSESSMENT & PLAN NOTE
Discussed risks and benefits of excision of multiple bilateral upper extremity lipomas and she agrees to proceed.  She just had blood work and EKG for hernia in April.  Will hold her Ozempic for 7 days, and Jardiance for 4 days.

## 2025-06-04 NOTE — LETTER
"2025     Yakov Neri MD  [address] [facility]  [address] [place] [id number]    Patient: Eliana Valladares   YOB: 1960   Date of Visit: 2025       Dear Dr. Yakov Neri MD:    Thank you for referring Eliana Valladares to me for evaluation. Below are my notes for this consultation.    If you have questions, please do not hesitate to call me. I look forward to following your patient along with you.         Sincerely,        Varun Jorge DO        CC: No Recipients    Varun Jorge DO  2025  2:58 PM  Sign when Signing Visit  Name: Eliana Valladares      : 1960      MRN: [id number]  Encounter Provider: Varun Jorge DO  Encounter Date: 2025   Encounter department: [facility] GENERAL SURGERY [facility]  :  Assessment & Plan  Lipoma of upper extremity, unspecified laterality  Discussed risks and benefits of excision of multiple bilateral upper extremity lipomas and she agrees to proceed.  She just had blood work and EKG for hernia in April.  Will hold her Ozempic for 7 days, and Jardiance for 4 days.           History of Present Illness  HPI  Eliana Valladares is a 65 y.o. female who presents for follow up on multiple lipomas on both of her arms.  She has had these removed in the past.  Has multiple lipomas of both arms she desires removal.  Some mid thigh swelling on the left but this appears to be involved with some saphenous vein swelling.  She is following up with vascular.  History obtained from: patient    Review of Systems   All other systems reviewed and are negative.    Medical History Reviewed by provider this encounter:     .     Objective  /86   Pulse (!) 110   Ht 5' 2\" (1.575 m)   Wt 69.4 kg (153 lb)   SpO2 97%   BMI 27.98 kg/m²      Physical Exam  Constitutional:       General: She is not in acute distress.  HENT:      Head: Atraumatic.      Mouth/Throat:      Mouth: Mucous membranes are moist.     Eyes:      Extraocular Movements: Extraocular " movements intact.       Cardiovascular:      Rate and Rhythm: Normal rate.   Pulmonary:      Effort: Pulmonary effort is normal.   Abdominal:      General: Abdomen is flat.      Palpations: Abdomen is soft.     Musculoskeletal:      Cervical back: Normal range of motion.      Comments: Multiple lipomas of bilateral forearms.  Seems to have a nest of the left triceps area.     Skin:     General: Skin is warm and dry.     Neurological:      Mental Status: She is alert.

## 2025-06-04 NOTE — PROGRESS NOTES
"Name: Eliana Valladares      : 1960      MRN: [id number]  Encounter Provider: Varun Jorge DO  Encounter Date: 2025   Encounter department: [facility] GENERAL SURGERY [facility]  :  Assessment & Plan  Lipoma of upper extremity, unspecified laterality  Discussed risks and benefits of excision of multiple bilateral upper extremity lipomas and she agrees to proceed.  She just had blood work and EKG for hernia in April.  Will hold her Ozempic for 7 days, and Jardiance for 4 days.           History of Present Illness   HPI  Eliana Valladares is a 65 y.o. female who presents for follow up on multiple lipomas on both of her arms.  She has had these removed in the past.  Has multiple lipomas of both arms she desires removal.  Some mid thigh swelling on the left but this appears to be involved with some saphenous vein swelling.  She is following up with vascular.  History obtained from: patient    Review of Systems   All other systems reviewed and are negative.    Medical History Reviewed by provider this encounter:     .     Objective   /86   Pulse (!) 110   Ht 5' 2\" (1.575 m)   Wt 69.4 kg (153 lb)   SpO2 97%   BMI 27.98 kg/m²      Physical Exam  Constitutional:       General: She is not in acute distress.  HENT:      Head: Atraumatic.      Mouth/Throat:      Mouth: Mucous membranes are moist.     Eyes:      Extraocular Movements: Extraocular movements intact.       Cardiovascular:      Rate and Rhythm: Normal rate.   Pulmonary:      Effort: Pulmonary effort is normal.   Abdominal:      General: Abdomen is flat.      Palpations: Abdomen is soft.     Musculoskeletal:      Cervical back: Normal range of motion.      Comments: Multiple lipomas of bilateral forearms.  Seems to have a nest of the left triceps area.     Skin:     General: Skin is warm and dry.     Neurological:      Mental Status: She is alert.           "

## 2025-06-19 ENCOUNTER — ANESTHESIA EVENT (OUTPATIENT)
Dept: ANESTHESIOLOGY | Facility: HOSPITAL | Age: 65
End: 2025-06-19

## 2025-06-19 ENCOUNTER — ANESTHESIA (OUTPATIENT)
Dept: ANESTHESIOLOGY | Facility: HOSPITAL | Age: 65
End: 2025-06-19

## 2025-06-24 NOTE — PRE-PROCEDURE INSTRUCTIONS
Pre-Surgery Instructions:   Medication Instructions    ALPRAZolam (XANAX) 0.25 mg tablet Uses PRN- OK to take day of surgery    Biotin 86254 MCG TABS Stop taking 7 days prior to surgery.    Calcium-Magnesium-Vitamin D (CALCIUM 1200+D3 PO) Stop taking 7 days prior to surgery.    CINNAMON PO Stop taking 7 days prior to surgery.    Fexofenadine HCl (ALLEGRA PO) Hold day of surgery.    Jardiance 25 MG TABS Stop taking 4 days prior to surgery.    lovastatin (MEVACOR) 40 MG tablet Take night before surgery    metFORMIN (GLUCOPHAGE) 500 mg tablet Hold day of surgery.    Multiple Vitamin (MULTI-VITAMIN DAILY PO) Stop taking 7 days prior to surgery.    Ozempic, 0.25 or 0.5 MG/DOSE, 2 MG/3ML injection pen Stop taking 7 days prior to surgery.    valsartan (DIOVAN) 160 mg tablet Hold day of surgery.   Medication instructions for day of surgery reviewed. Please take all instructed medications with only a sip of water. Please do not take any over the counter (non-prescribed) vitamins or supplements for one week prior to date of surgery.      You will receive a call one business day prior to surgery with an arrival time and hospital directions. If your surgery is scheduled on a Monday, the hospital will be calling you on the Friday prior to your surgery. If you have not heard from anyone by 8pm, please call the hospital supervisor through the hospital  at [phone]. ([place] [phone] or [place] [phone]).    Do not eat or drink anything after midnight the night before your surgery, including candy, mints, lifesavers, or chewing gum. Do not drink alcohol 24hrs before your surgery. Try not to smoke at least 24hrs before your surgery.       Follow the pre surgery showering instructions as listed in the “My Surgical Experience Booklet” or otherwise provided by your surgeon's office. Do not use a blade to shave the surgical area 1 week before surgery. It is okay to use a clean electric clippers up to 24 hours  before surgery. Do not apply any lotions, creams, including makeup, cologne, deodorant, or perfumes after showering on the day of your surgery. Do not use dry shampoo, hair spray, hair gel, or any type of hair products.     No contact lenses, eye make-up, or artificial eyelashes. Remove nail polish, including gel polish, and any artificial, gel, or acrylic nails if possible. Remove all jewelry including rings and body piercing jewelry.     Wear causal clothing that is easy to take on and off. Consider your type of surgery.    Keep any valuables, jewelry, piercings at home. Please bring any specially ordered equipment (sling, braces) if indicated.    Arrange for a responsible person to drive you to and from the hospital on the day of your surgery. Please confirm the visitor policy for the day of your procedure when you receive your phone call with an arrival time.     Call the surgeon's office with any new illnesses, exposures, or additional questions prior to surgery.    Please reference your “My Surgical Experience Booklet” for additional information to prepare for your upcoming surgery.     No vitamins or NSAIDs 1 week before surgery. Tylenol ok if needed.

## 2025-06-27 PROCEDURE — 88305 TISSUE EXAM BY PATHOLOGIST: CPT | Performed by: PATHOLOGY

## 2025-06-30 ENCOUNTER — LAB REQUISITION (OUTPATIENT)
Dept: LAB | Facility: HOSPITAL | Age: 65
End: 2025-06-30
Payer: COMMERCIAL

## 2025-06-30 DIAGNOSIS — D48.19 OTHER SPECIFIED NEOPLASM OF UNCERTAIN BEHAVIOR OF CONNECTIVE AND OTHER SOFT TISSUE: ICD-10-CM

## 2025-07-02 ENCOUNTER — OFFICE VISIT (OUTPATIENT)
Dept: VASCULAR SURGERY | Facility: CLINIC | Age: 65
End: 2025-07-02
Attending: SURGERY
Payer: COMMERCIAL

## 2025-07-02 VITALS
OXYGEN SATURATION: 96 % | DIASTOLIC BLOOD PRESSURE: 80 MMHG | BODY MASS INDEX: 29.44 KG/M2 | WEIGHT: 160 LBS | TEMPERATURE: 98.6 F | HEART RATE: 94 BPM | HEIGHT: 62 IN | SYSTOLIC BLOOD PRESSURE: 128 MMHG

## 2025-07-02 DIAGNOSIS — I83.93 ASYMPTOMATIC SUPERFICIAL VARICOSITIES OF LOWER EXTREMITY, BILATERAL: ICD-10-CM

## 2025-07-02 DIAGNOSIS — I83.893 VARICOSE VEINS OF BILATERAL LOWER EXTREMITIES WITH OTHER COMPLICATIONS: ICD-10-CM

## 2025-07-02 DIAGNOSIS — D17.24 LIPOMA OF LEFT LOWER EXTREMITY: Primary | ICD-10-CM

## 2025-07-02 PROCEDURE — 88305 TISSUE EXAM BY PATHOLOGIST: CPT | Performed by: PATHOLOGY

## 2025-07-02 PROCEDURE — 99242 OFF/OP CONSLTJ NEW/EST SF 20: CPT | Performed by: SURGERY

## 2025-07-02 NOTE — ASSESSMENT & PLAN NOTE
Scattered varicose veins in both legs, more in left thigh, there are several overlying lipomas which are separate from the veins.  Reviewed reflux study, there is GSV reflux.      There are no symptoms of varicose veins related issues like leg swelling, cramps, etc.  So I dont recommed any procedure.

## 2025-07-02 NOTE — ASSESSMENT & PLAN NOTE
Few lipomas on left thigh and one on right outer thigh in the vicinity of varicose veins.  As lipoma is not very symptomatic we can observe it.  Follow up as needed.  If in future it gets worse we can consider removal of these lipomas.

## 2025-07-02 NOTE — PROGRESS NOTES
Name: Eliana Valladares      : 1960      MRN: [id number]  Encounter Provider: Barb Marin MD  Encounter Date: 2025   Encounter department: [facility]  :  Assessment & Plan  Asymptomatic superficial varicosities of lower extremity, bilateral    Orders:  •  Ambulatory Referral to Vascular Surgery    Lipoma of left lower extremity  Few lipomas on left thigh and one on right outer thigh in the vicinity of varicose veins.  As lipoma is not very symptomatic we can observe it.  Follow up as needed.  If in future it gets worse we can consider removal of these lipomas.       Varicose veins of bilateral lower extremities with other complications  Scattered varicose veins in both legs, more in left thigh, there are several overlying lipomas which are separate from the veins.  Reviewed reflux study, there is GSV reflux.      There are no symptoms of varicose veins related issues like leg swelling, cramps, etc.  So I dont recommed any procedure.           History of Present Illness   HPI  Eliana Valladares is a 65 y.o. female who presents for left and right leg lipoma.    Patient presents for a Consult - Asymptomatic superficial varicosities of lower extremity, bilateral - LEVDR 05/15/25. Patient complains of upper left and right thigh lipomas. Patient stated she has slight pain to left upper thigh.   History obtained from: patient    Review of Systems   All other systems reviewed and are negative.    Past Medical History   Past Medical History[1]  Past Surgical History[2]  Family History[3]   reports that she has never smoked. She has never used smokeless tobacco. She reports that she does not currently use alcohol. She reports that she does not use drugs.  Current Outpatient Medications   Medication Instructions   • ALPRAZolam (XANAX) 0.25 mg, Daily   • Biotin 47178 MCG TABS 4 times weekly   • Calcium-Magnesium-Vitamin D (CALCIUM 1200+D3 PO) No dose, route, or frequency recorded.   • CINNAMON  "PO 1,000 mg, 4 times weekly   • Fexofenadine HCl (ALLEGRA PO) Take by mouth   • Jardiance 25 MG TABS    • lovastatin (MEVACOR) 40 MG tablet 1 tablet, Daily at bedtime   • metFORMIN (GLUCOPHAGE) 1,000 mg, Daily at bedtime   • Multiple Vitamin (MULTI-VITAMIN DAILY PO) Take by mouth   • Ozempic, 0.25 or 0.5 MG/DOSE, 2 MG/3ML injection pen Weekly   • valsartan (DIOVAN) 160 mg tablet No dose, route, or frequency recorded.   Allergies[4]      Objective   /80 (BP Location: Left arm, Patient Position: Sitting, Cuff Size: Adult)   Pulse 94   Temp 98.6 °F (37 °C) (Temporal)   Ht 5' 2\" (1.575 m)   Wt 72.6 kg (160 lb)   SpO2 96%   BMI 29.26 kg/m²      Physical Exam  Vitals and nursing note reviewed.   Constitutional:       Appearance: Normal appearance. She is obese.   HENT:      Head: Normocephalic and atraumatic.     Cardiovascular:      Rate and Rhythm: Normal rate and regular rhythm.     Musculoskeletal:      Right lower leg: No edema.      Left lower leg: No edema.      Comments: Right and left thigh lipoma and scattered varicose veins.     Skin:     General: Skin is warm and dry.      Capillary Refill: Capillary refill takes less than 2 seconds.     Neurological:      General: No focal deficit present.      Mental Status: She is alert and oriented to person, place, and time.                  [1]  Past Medical History:  Diagnosis Date   • Anxiety    • Diabetes mellitus (HCC)    • GERD (gastroesophageal reflux disease)    • Hyperlipidemia    • Hypertension    • PONV (postoperative nausea and vomiting)    [2]  Past Surgical History:  Procedure Laterality Date   •  SECTION      x3   • CHOLECYSTECTOMY     • COLONOSCOPY     • GALLBLADDER SURGERY  2013    removal   • HERNIA REPAIR      Incisional 2005   • MA RPR AA HERNIA 1ST < 3 CM REDUCIBLE N/A 2025    Procedure: REPAIR HERNIA INCISIONAL WITH MESH;  Surgeon: Varun Jorge DO;  Location: [facility];  Service: General   • SHOULDER SURGERY Left " 2015    roatator cuff   [3]  Family History  Problem Relation Name Age of Onset   • Diabetes Mother Margaret    • Heart disease Mother Margaret    [4]  Allergies  Allergen Reactions   • Penicillins Other (See Comments)     Childhood unknown response

## 2025-07-03 ENCOUNTER — HOSPITAL ENCOUNTER (OUTPATIENT)
Facility: AMBULARY SURGERY CENTER | Age: 65
Setting detail: OUTPATIENT SURGERY
Discharge: HOME | End: 2025-07-03
Attending: SURGERY | Admitting: SURGERY
Payer: COMMERCIAL

## 2025-07-03 ENCOUNTER — ANESTHESIA (OUTPATIENT)
Dept: PERIOP | Facility: AMBULARY SURGERY CENTER | Age: 65
End: 2025-07-03
Payer: COMMERCIAL

## 2025-07-03 ENCOUNTER — ANESTHESIA EVENT (OUTPATIENT)
Dept: PERIOP | Facility: AMBULARY SURGERY CENTER | Age: 65
End: 2025-07-03
Payer: COMMERCIAL

## 2025-07-03 VITALS
WEIGHT: 157 LBS | TEMPERATURE: 97.9 F | SYSTOLIC BLOOD PRESSURE: 141 MMHG | RESPIRATION RATE: 14 BRPM | BODY MASS INDEX: 28.72 KG/M2 | HEART RATE: 70 BPM | OXYGEN SATURATION: 94 % | DIASTOLIC BLOOD PRESSURE: 71 MMHG

## 2025-07-03 DIAGNOSIS — D17.9 LIPOMA: Primary | ICD-10-CM

## 2025-07-03 LAB
GLUCOSE SERPL-MCNC: 125 MG/DL (ref 65–140)
GLUCOSE SERPL-MCNC: 129 MG/DL (ref 65–140)

## 2025-07-03 PROCEDURE — 82948 REAGENT STRIP/BLOOD GLUCOSE: CPT

## 2025-07-03 PROCEDURE — 25075 EXC FOREARM LES SC < 3 CM: CPT | Performed by: SURGERY

## 2025-07-03 PROCEDURE — 24071 EXC ARM/ELBOW LES SC 3 CM/>: CPT | Performed by: SURGERY

## 2025-07-03 PROCEDURE — 88304 TISSUE EXAM BY PATHOLOGIST: CPT | Performed by: STUDENT IN AN ORGANIZED HEALTH CARE EDUCATION/TRAINING PROGRAM

## 2025-07-03 RX ORDER — OXYCODONE HYDROCHLORIDE 5 MG/1
5 TABLET ORAL EVERY 4 HOURS PRN
Qty: 10 TABLET | Refills: 0 | Status: SHIPPED | OUTPATIENT
Start: 2025-07-03 | End: 2025-07-13

## 2025-07-03 RX ORDER — FENTANYL CITRATE/PF 50 MCG/ML
50 SYRINGE (ML) INJECTION
Status: DISCONTINUED | OUTPATIENT
Start: 2025-07-03 | End: 2025-07-03 | Stop reason: HOSPADM

## 2025-07-03 RX ORDER — DEXAMETHASONE SODIUM PHOSPHATE 10 MG/ML
INJECTION, SOLUTION INTRAMUSCULAR; INTRAVENOUS AS NEEDED
Status: DISCONTINUED | OUTPATIENT
Start: 2025-07-03 | End: 2025-07-03

## 2025-07-03 RX ORDER — LIDOCAINE HYDROCHLORIDE 10 MG/ML
INJECTION, SOLUTION EPIDURAL; INFILTRATION; INTRACAUDAL; PERINEURAL AS NEEDED
Status: DISCONTINUED | OUTPATIENT
Start: 2025-07-03 | End: 2025-07-03

## 2025-07-03 RX ORDER — PROPOFOL 10 MG/ML
INJECTION, EMULSION INTRAVENOUS AS NEEDED
Status: DISCONTINUED | OUTPATIENT
Start: 2025-07-03 | End: 2025-07-03

## 2025-07-03 RX ORDER — ONDANSETRON 2 MG/ML
4 INJECTION INTRAMUSCULAR; INTRAVENOUS EVERY 4 HOURS PRN
Status: DISCONTINUED | OUTPATIENT
Start: 2025-07-03 | End: 2025-07-03 | Stop reason: HOSPADM

## 2025-07-03 RX ORDER — ONDANSETRON 2 MG/ML
4 INJECTION INTRAMUSCULAR; INTRAVENOUS ONCE AS NEEDED
Status: DISCONTINUED | OUTPATIENT
Start: 2025-07-03 | End: 2025-07-03 | Stop reason: HOSPADM

## 2025-07-03 RX ORDER — MIDAZOLAM HYDROCHLORIDE 2 MG/2ML
INJECTION, SOLUTION INTRAMUSCULAR; INTRAVENOUS AS NEEDED
Status: DISCONTINUED | OUTPATIENT
Start: 2025-07-03 | End: 2025-07-03

## 2025-07-03 RX ORDER — BUPIVACAINE HYDROCHLORIDE AND EPINEPHRINE 2.5; 5 MG/ML; UG/ML
INJECTION, SOLUTION EPIDURAL; INFILTRATION; INTRACAUDAL; PERINEURAL AS NEEDED
Status: DISCONTINUED | OUTPATIENT
Start: 2025-07-03 | End: 2025-07-03 | Stop reason: HOSPADM

## 2025-07-03 RX ORDER — MAGNESIUM HYDROXIDE 1200 MG/15ML
LIQUID ORAL AS NEEDED
Status: DISCONTINUED | OUTPATIENT
Start: 2025-07-03 | End: 2025-07-03 | Stop reason: HOSPADM

## 2025-07-03 RX ORDER — CEFAZOLIN SODIUM 2 G/50ML
2000 SOLUTION INTRAVENOUS ONCE
Status: COMPLETED | OUTPATIENT
Start: 2025-07-03 | End: 2025-07-03

## 2025-07-03 RX ORDER — ONDANSETRON 2 MG/ML
INJECTION INTRAMUSCULAR; INTRAVENOUS AS NEEDED
Status: DISCONTINUED | OUTPATIENT
Start: 2025-07-03 | End: 2025-07-03

## 2025-07-03 RX ORDER — HYDROMORPHONE HCL/PF 1 MG/ML
0.5 SYRINGE (ML) INJECTION
Refills: 0 | Status: DISCONTINUED | OUTPATIENT
Start: 2025-07-03 | End: 2025-07-03 | Stop reason: HOSPADM

## 2025-07-03 RX ORDER — OXYCODONE HYDROCHLORIDE 5 MG/1
5 TABLET ORAL EVERY 4 HOURS PRN
Refills: 0 | Status: DISCONTINUED | OUTPATIENT
Start: 2025-07-03 | End: 2025-07-03 | Stop reason: HOSPADM

## 2025-07-03 RX ORDER — SODIUM CHLORIDE, SODIUM LACTATE, POTASSIUM CHLORIDE, CALCIUM CHLORIDE 600; 310; 30; 20 MG/100ML; MG/100ML; MG/100ML; MG/100ML
125 INJECTION, SOLUTION INTRAVENOUS CONTINUOUS
Status: DISCONTINUED | OUTPATIENT
Start: 2025-07-03 | End: 2025-07-03 | Stop reason: HOSPADM

## 2025-07-03 RX ORDER — FENTANYL CITRATE 50 UG/ML
INJECTION, SOLUTION INTRAMUSCULAR; INTRAVENOUS AS NEEDED
Status: DISCONTINUED | OUTPATIENT
Start: 2025-07-03 | End: 2025-07-03

## 2025-07-03 RX ORDER — ACETAMINOPHEN 325 MG/1
650 TABLET ORAL ONCE
Status: COMPLETED | OUTPATIENT
Start: 2025-07-03 | End: 2025-07-03

## 2025-07-03 RX ORDER — SODIUM CHLORIDE, SODIUM LACTATE, POTASSIUM CHLORIDE, CALCIUM CHLORIDE 600; 310; 30; 20 MG/100ML; MG/100ML; MG/100ML; MG/100ML
INJECTION, SOLUTION INTRAVENOUS CONTINUOUS PRN
Status: DISCONTINUED | OUTPATIENT
Start: 2025-07-03 | End: 2025-07-03

## 2025-07-03 RX ADMIN — FENTANYL CITRATE 50 MCG: 50 INJECTION INTRAMUSCULAR; INTRAVENOUS at 08:40

## 2025-07-03 RX ADMIN — FENTANYL CITRATE 50 MCG: 50 INJECTION INTRAMUSCULAR; INTRAVENOUS at 07:30

## 2025-07-03 RX ADMIN — SODIUM CHLORIDE, SODIUM LACTATE, POTASSIUM CHLORIDE, AND CALCIUM CHLORIDE: .6; .31; .03; .02 INJECTION, SOLUTION INTRAVENOUS at 07:15

## 2025-07-03 RX ADMIN — ACETAMINOPHEN 650 MG: 325 TABLET, FILM COATED ORAL at 10:58

## 2025-07-03 RX ADMIN — LIDOCAINE HYDROCHLORIDE 50 MG: 10 INJECTION, SOLUTION EPIDURAL; INFILTRATION; INTRACAUDAL at 07:30

## 2025-07-03 RX ADMIN — PROPOFOL 200 MG: 10 INJECTION, EMULSION INTRAVENOUS at 07:30

## 2025-07-03 RX ADMIN — FENTANYL CITRATE 50 MCG: 50 INJECTION INTRAMUSCULAR; INTRAVENOUS at 08:48

## 2025-07-03 RX ADMIN — ONDANSETRON 4 MG: 2 INJECTION, SOLUTION INTRAMUSCULAR; INTRAVENOUS at 09:20

## 2025-07-03 RX ADMIN — MIDAZOLAM 2 MG: 1 INJECTION INTRAMUSCULAR; INTRAVENOUS at 07:25

## 2025-07-03 RX ADMIN — ONDANSETRON 4 MG: 2 INJECTION INTRAMUSCULAR; INTRAVENOUS at 07:30

## 2025-07-03 RX ADMIN — DEXAMETHASONE SODIUM PHOSPHATE 10 MG: 10 INJECTION INTRAMUSCULAR; INTRAVENOUS at 07:30

## 2025-07-03 RX ADMIN — CEFAZOLIN SODIUM 2000 MG: 2 SOLUTION INTRAVENOUS at 07:37

## 2025-07-03 RX ADMIN — FENTANYL CITRATE 25 MCG: 50 INJECTION INTRAMUSCULAR; INTRAVENOUS at 08:08

## 2025-07-03 RX ADMIN — FENTANYL CITRATE 25 MCG: 50 INJECTION INTRAMUSCULAR; INTRAVENOUS at 07:49

## 2025-07-03 NOTE — INTERVAL H&P NOTE
H&P reviewed. After examining the patient I find no changes in the patients condition since the H&P had been written.    Vitals:    07/03/25 0651   BP: 139/85   Pulse: 89   Resp: 20   Temp: (!) 97.3 °F (36.3 °C)   SpO2: 95%       5 on left, 4 on right

## 2025-07-03 NOTE — ANESTHESIA PREPROCEDURE EVALUATION
Procedure:  EXCISION LIPOMA BOTH ARMS (Bilateral: Arm)    Relevant Problems   ANESTHESIA   (+) PONV (postoperative nausea and vomiting)      CARDIO   (+) Hyperlipidemia   (+) Hypertension   (+) Varicose veins of bilateral lower extremities with other complications      GI/HEPATIC   (+) GERD (gastroesophageal reflux disease)      NEURO/PSYCH   (+) Anxiety        Physical Exam    Airway     Mallampati score: II  TM Distance: >3 FB  Neck ROM: full  Upper bite lip test: I  Mouth opening: >= 4 cm      Cardiovascular  Cardiovascular exam normal    Dental   No notable dental hx     Pulmonary  Pulmonary exam normal     Neurological    She appears awake, alert and oriented x3.      Other Findings  post-pubertal.      Anesthesia Plan  ASA Score- 2     Anesthesia Type- general with ASA Monitors.         Additional Monitors:     Airway Plan: LMA and LMA.           Plan Factors-Exercise tolerance (METS): >4 METS.    Chart reviewed.    Patient summary reviewed.    Patient is not a current smoker.              Induction-     Postoperative Plan- Plan for postoperative opioid use.   Monitoring Plan - Monitoring plan - standard ASA monitoring  Post Operative Pain Plan - plan for postoperative opioid use    Perioperative Resuscitation Plan - Level 1 - Full Code.       Informed Consent- Anesthetic plan and risks discussed with patient.  I personally reviewed this patient with the CRNA. Discussed and agreed on the Anesthesia Plan with the CRNA..      NPO Status:  Vitals Value Taken Time   Date of last liquid 07/02/25 07/03/25 06:51   Time of last liquid 2300 07/03/25 06:51   Date of last solid 07/02/25 07/03/25 06:51   Time of last solid 2200 07/03/25 06:51

## 2025-07-03 NOTE — ANESTHESIA POSTPROCEDURE EVALUATION
Post-Op Assessment Note    CV Status:  Stable    Pain management: adequate       Mental Status:  Alert and awake   Hydration Status:  Euvolemic   PONV Controlled:  Controlled   Airway Patency:  Patent     Post Op Vitals Reviewed: Yes    No anethesia notable event occurred.    Staff: Anesthesiologist           Last Filed PACU Vitals:  Vitals Value Taken Time   Temp 98 °F (36.7 °C) 07/03/25 09:00   Pulse 71 07/03/25 09:08   /70 07/03/25 09:00   Resp 8 07/03/25 09:08   SpO2 96 % 07/03/25 09:08   Vitals shown include unfiled device data.    Modified Tracey:     Vitals Value Taken Time   Activity 2 07/03/25 09:00   Respiration 2 07/03/25 09:00   Circulation 2 07/03/25 09:00   Consciousness 1 07/03/25 09:00   Oxygen Saturation 2 07/03/25 09:00     Modified Tracey Score: 9

## 2025-07-03 NOTE — OP NOTE
OPERATIVE REPORT  PATIENT NAME: Eliana Valladares    :  1960  MRN: [id number]  Pt Location: [facility] OR ROOM 01    SURGERY DATE: 7/3/2025    Surgeons and Role:     * Varun Jorge DO - Primary     * Tommie Katz MD - Assisting    Preop Diagnosis:  Lipoma [D17.9], multiple bilateral arms    Post-Op Diagnosis Codes:     * Lipoma [D17.9], multiple bilateral arms    Procedure(s):  Bilateral - EXCISION LIPOMA BOTH ARMS    Specimen(s):  ID Type Source Tests Collected by Time Destination   1 : Left arm Lipoma Tissue Soft Tissue, Other TISSUE EXAM Varun Jorge, DO 7/3/2025 0748    2 : Right Arm Lipoma Tissue Soft Tissue, Other TISSUE EXAM Varun Jorge, DO 7/3/2025 0752        Estimated Blood Loss:   Minimal    Drains:  * No LDAs found *    Anesthesia Type:   General    Operative Indications:  Lipoma [D17.9]  Multiple bilateral arms    Operative Findings:  4 lipomas of the right arm.  2 cm, 1 cm, 1 cm, 1 cm  5 lipomas of the left arm.  3 cm, 2 cm, 1 cm, 1 cm, 1 cm    All removed with no specific margin  Height 62 inches weight 71 kg / 157 pounds.  BMI 29.  ASA 2.  Wound class I       Complications:   None    Procedure and Technique:  Patient was brought the operative suite identified by visualization, conversation, by armband.  Sequential compression pumps were placed.  She was given Ancef perioperatively.  Once under general anesthesia both arms were prepped and draped in a sterile fashion with the use of stockinette and extremity drapes.  Timeout was performed and it was assured that the prep was dried.  I cut the left-handed stockinette first.  Local was instilled over all 5 sites of the left arm.  Skin incision was made and the underlying lipomas were carefully excised using a hemostat.  The left upper incision required some hot cautery for hemostasis.  3-0 Vicryl was used to close subcutaneous tissues of the upper left arm incision.  4-0 Monocryl was used to close all other skin incisions in a  subcuticular fashion.    The timeout was performed and the right arm was addressed.  Stockinette was cut exposing the 4 sites.  Local was instilled over each of these.  Small skin incision was made and the lipomas were extruded with the subcutaneous fatty tissue with a hemostat.  Hemostasis was utilized with by hot cautery.  4-0 Monocryl was used to close all skin incisions in a subcuticular fashion.  The end of the case both arms were washed and dried.  Sterile skin glue was applied over each of the sites.  She was awakened in the operating room returned to the recovery area in stable condition having tolerated the procedure well.   I was present for the entire procedure.    Patient Disposition:  PACU            SIGNATURE: Varun Jorge,   DATE: July 3, 2025  TIME: 8:12 AM

## 2025-07-03 NOTE — DISCHARGE INSTR - AVS FIRST PAGE
Apply ice to incisions is much as you can.    May shower starting 7/4    Resume home medications    May apply a bandage over the incisions if you notice drainage    Use Tylenol or ibuprofen for pain.  Prescription for oxycodone provided

## 2025-07-03 NOTE — ANESTHESIA POSTPROCEDURE EVALUATION
Post-Op Assessment Note    CV Status:  Stable  Pain Score: 0    Pain management: adequate       Mental Status:  Alert and awake   Hydration Status:  Stable   PONV Controlled:  None   Airway Patency:  Patent and adequate     Post Op Vitals Reviewed: Yes    No anethesia notable event occurred.    Staff: Anesthesiologist, CRNA           Last Filed PACU Vitals:  Vitals Value Taken Time   Temp 98.4 °F (36.9 °C) 07/03/25 08:18   Pulse 102 07/03/25 08:19   /77 07/03/25 08:20   Resp 16    SpO2 94 % 07/03/25 08:19   Vitals shown include unfiled device data.

## 2025-07-09 PROCEDURE — 88304 TISSUE EXAM BY PATHOLOGIST: CPT | Performed by: STUDENT IN AN ORGANIZED HEALTH CARE EDUCATION/TRAINING PROGRAM

## 2025-07-14 ENCOUNTER — TELEPHONE (OUTPATIENT)
Age: 65
End: 2025-07-14

## 2025-07-14 NOTE — TELEPHONE ENCOUNTER
Patient called in to reschedule her post op appointment with Dr. Jorge. Patient is rescheduled accordingly.     Patient stated she was provided Yuly's email address to send over forms for her insurance. She has not received notification that the forms were received nor completed. Provided patient with fax number to attempt to fax the paperwork. Please call the patient if the forms have been received and completed.

## 2025-07-18 ENCOUNTER — TELEPHONE (OUTPATIENT)
Age: 65
End: 2025-07-18

## 2025-07-18 NOTE — TELEPHONE ENCOUNTER
Patient called to make sure that she has no co-pay for the post-op appt with Dr. Jorge on Wed 7/23. Please contact her to make sure that she has no co-pay. She was not happy that she was charged $50 co-pay for her last post op appt. She stated that if there is a co-pay she will cancel her appt. - Please contact her at anytime and leave a voicemail. Thank you

## 2025-07-23 ENCOUNTER — OFFICE VISIT (OUTPATIENT)
Dept: SURGERY | Facility: CLINIC | Age: 65
End: 2025-07-23

## 2025-07-23 DIAGNOSIS — D17.9 LIPOMA: Primary | ICD-10-CM

## 2025-07-23 PROCEDURE — 99024 POSTOP FOLLOW-UP VISIT: CPT | Performed by: SURGERY

## 2025-07-23 NOTE — ASSESSMENT & PLAN NOTE
Status post excision of multiple lipomas of both arms.  All wounds healing well.  Pathology reviewed.  Return as needed

## 2025-07-23 NOTE — PROGRESS NOTES
Name: Eliana Valladares      : 1960      MRN: [id number]  Encounter Provider: Varun Jorge DO  Encounter Date: 2025   Encounter department: [facility]  :  Assessment & Plan  Lipoma  Status post excision of multiple lipomas of both arms.  All wounds healing well.  Pathology reviewed.  Return as needed           History of Present Illness   HPI  Eliana Valladares is a 65 y.o. female who presents post operatively.  Excision lipoma both arms 7/3/2025.  Noted some prominence of some of the scars but overall doing well     Final Diagnosis   A. Soft Tissue, Left Arm Mass, Excision:  - Angiolipoma.      B. Soft Tissue, Right Arm Mass, Excision:  - Angiolipoma.        History obtained from: patient    Review of Systems   All other systems reviewed and are negative.    Medical History Reviewed by provider this encounter:     .     Objective   There were no vitals taken for this visit.     Physical Exam    Skin:     General: Skin is warm and dry.      Comments: Well-healed incisions of both arms.  Healing ridge as expected.  No signs of infection

## 2025-07-23 NOTE — LETTER
2025     Yakov Neri MD  [address] [address]    Patient: Eliana Valladares   YOB: 1960   Date of Visit: 2025       Dear Dr. Yakov Neir MD:    Thank you for referring Eliana Valladares to me for evaluation. Below are my notes for this consultation.    If you have questions, please do not hesitate to call me. I look forward to following your patient along with you.         Sincerely,        Varun Jorge DO        CC: No Recipients    Varun Jorge DO  2025  4:43 PM  Sign when Signing Visit  Name: Eliana Valladares      : 1960      MRN: [id number]  Encounter Provider: Varun Jorge DO  Encounter Date: 2025   Encounter department: [facility]  :  Assessment & Plan  Lipoma  Status post excision of multiple lipomas of both arms.  All wounds healing well.  Pathology reviewed.  Return as needed           History of Present Illness  HPI  Eliana Valladares is a 65 y.o. female who presents post operatively.  Excision lipoma both arms 7/3/2025.  Noted some prominence of some of the scars but overall doing well     Final Diagnosis   A. Soft Tissue, Left Arm Mass, Excision:  - Angiolipoma.      B. Soft Tissue, Right Arm Mass, Excision:  - Angiolipoma.        History obtained from: patient    Review of Systems   All other systems reviewed and are negative.    Medical History Reviewed by provider this encounter:     .     Objective  There were no vitals taken for this visit.     Physical Exam    Skin:     General: Skin is warm and dry.      Comments: Well-healed incisions of both arms.  Healing ridge as expected.  No signs of infection

## 2025-08-18 ENCOUNTER — ANNUAL EXAM (OUTPATIENT)
Dept: OBGYN CLINIC | Facility: CLINIC | Age: 65
End: 2025-08-18
Payer: COMMERCIAL

## 2025-08-18 VITALS — DIASTOLIC BLOOD PRESSURE: 80 MMHG | BODY MASS INDEX: 28.68 KG/M2 | SYSTOLIC BLOOD PRESSURE: 120 MMHG | WEIGHT: 156.8 LBS

## 2025-08-18 DIAGNOSIS — Z01.419 PAP SMEAR, AS PART OF ROUTINE GYNECOLOGICAL EXAMINATION: ICD-10-CM

## 2025-08-18 DIAGNOSIS — Z01.419 ENCOUNTER FOR GYNECOLOGICAL EXAMINATION WITHOUT ABNORMAL FINDING: Primary | ICD-10-CM

## 2025-08-18 DIAGNOSIS — Z12.31 ENCOUNTER FOR SCREENING MAMMOGRAM FOR BREAST CANCER: ICD-10-CM

## 2025-08-18 DIAGNOSIS — N95.2 ATROPHIC VAGINITIS: ICD-10-CM

## 2025-08-18 PROCEDURE — S0612 ANNUAL GYNECOLOGICAL EXAMINA: HCPCS | Performed by: OBSTETRICS & GYNECOLOGY

## 2025-08-21 LAB
CLINICAL INFO: NORMAL
CYTO CVX: NORMAL
CYTOLOGY CMNT CVX/VAG CYTO-IMP: NORMAL
DATE PREVIOUS BX: NORMAL
HPV E6+E7 MRNA CVX QL NAA+PROBE: NOT DETECTED
LMP START DATE: NORMAL
SL AMB PREV. PAP:: NORMAL
SPECIMEN SOURCE CVX/VAG CYTO: NORMAL
STAT OF ADQ CVX/VAG CYTO-IMP: NORMAL

## (undated) DEVICE — CHLORAPREP HI-LITE 26ML ORANGE

## (undated) DEVICE — ANTIBACTERIAL UNDYED BRAIDED (POLYGLACTIN 910), SYNTHETIC ABSORBABLE SUTURE: Brand: COATED VICRYL

## (undated) DEVICE — DECANTER: Brand: UNBRANDED

## (undated) DEVICE — Device

## (undated) DEVICE — BETHLEHEM UNIVERSAL MINOR GEN: Brand: CARDINAL HEALTH

## (undated) DEVICE — NEEDLE 23G X 1 1/2 SAFETY-GLIDE THIN WALL

## (undated) DEVICE — INTENDED FOR TISSUE SEPARATION, AND OTHER PROCEDURES THAT REQUIRE A SHARP SURGICAL BLADE TO PUNCTURE OR CUT.: Brand: BARD-PARKER SAFETY BLADES SIZE 15, STERILE

## (undated) DEVICE — NEPTUNE E-SEP SMOKE EVACUATION PENCIL, COATED, 70MM BLADE, PUSH BUTTON SWITCH: Brand: NEPTUNE E-SEP

## (undated) DEVICE — SUT MONOCRYL 4-0 PS-2 27 IN Y426H

## (undated) DEVICE — SUT VICRYL 2-0 SH 27 IN UNDYED J417H

## (undated) DEVICE — GLOVE SRG BIOGEL ORTHOPEDIC 8

## (undated) DEVICE — TOWEL SURG XR DETECT GREEN STRL RFD

## (undated) DEVICE — BULB SYRINGE, IRRIGATION WITH PROTECTIVE CAP, 60 CC, INDIVIDUALLY WRAPPED: Brand: DOVER

## (undated) DEVICE — EXOFIN PRECISION PEN HIGH VISCOSITY TOPICAL SKIN ADHESIVE: Brand: EXOFIN PRECISION PEN, 1G